# Patient Record
Sex: FEMALE | Race: WHITE | NOT HISPANIC OR LATINO | Employment: OTHER | URBAN - METROPOLITAN AREA
[De-identification: names, ages, dates, MRNs, and addresses within clinical notes are randomized per-mention and may not be internally consistent; named-entity substitution may affect disease eponyms.]

---

## 2017-01-04 RX ORDER — RALOXIFENE HYDROCHLORIDE 60 MG/1
TABLET, FILM COATED ORAL
Qty: 90 TABLET | Refills: 3 | Status: SHIPPED | OUTPATIENT
Start: 2017-01-04 | End: 2017-06-26

## 2017-01-06 ENCOUNTER — OFFICE VISIT (OUTPATIENT)
Dept: INTERNAL MEDICINE | Facility: CLINIC | Age: 77
End: 2017-01-06

## 2017-01-06 VITALS
WEIGHT: 134 LBS | TEMPERATURE: 97.9 F | HEIGHT: 64 IN | SYSTOLIC BLOOD PRESSURE: 114 MMHG | BODY MASS INDEX: 22.88 KG/M2 | DIASTOLIC BLOOD PRESSURE: 72 MMHG

## 2017-01-06 DIAGNOSIS — R10.9 STOMACH PAIN: Primary | ICD-10-CM

## 2017-01-06 LAB
ALBUMIN SERPL-MCNC: 4.2 G/DL (ref 3.2–4.8)
ALBUMIN/GLOB SERPL: 1.6 G/DL (ref 1.5–2.5)
ALP SERPL-CCNC: 57 U/L (ref 25–100)
ALT SERPL W P-5'-P-CCNC: 13 U/L (ref 7–40)
AMYLASE SERPL-CCNC: 76 U/L (ref 30–118)
ANION GAP SERPL CALCULATED.3IONS-SCNC: 9 MMOL/L (ref 3–11)
AST SERPL-CCNC: 23 U/L (ref 0–33)
BASOPHILS # BLD AUTO: 0.01 10*3/MM3 (ref 0–0.2)
BASOPHILS NFR BLD AUTO: 0.2 % (ref 0–1)
BILIRUB SERPL-MCNC: 0.4 MG/DL (ref 0.3–1.2)
BUN BLD-MCNC: 25 MG/DL (ref 9–23)
BUN/CREAT SERPL: 27.8 (ref 7–25)
CALCIUM SPEC-SCNC: 9.7 MG/DL (ref 8.7–10.4)
CHLORIDE SERPL-SCNC: 108 MMOL/L (ref 99–109)
CO2 SERPL-SCNC: 27 MMOL/L (ref 20–31)
CREAT BLD-MCNC: 0.9 MG/DL (ref 0.6–1.3)
DEPRECATED RDW RBC AUTO: 46.6 FL (ref 37–54)
EOSINOPHIL # BLD AUTO: 0.08 10*3/MM3 (ref 0.1–0.3)
EOSINOPHIL NFR BLD AUTO: 1.9 % (ref 0–3)
ERYTHROCYTE [DISTWIDTH] IN BLOOD BY AUTOMATED COUNT: 13.1 % (ref 11.3–14.5)
GFR SERPL CREATININE-BSD FRML MDRD: 61 ML/MIN/1.73
GLOBULIN UR ELPH-MCNC: 2.7 GM/DL
GLUCOSE BLD-MCNC: 84 MG/DL (ref 70–100)
HCT VFR BLD AUTO: 45.1 % (ref 34.5–44)
HGB BLD-MCNC: 14.7 G/DL (ref 11.5–15.5)
IMM GRANULOCYTES # BLD: 0 10*3/MM3 (ref 0–0.03)
IMM GRANULOCYTES NFR BLD: 0 % (ref 0–0.6)
LIPASE SERPL-CCNC: 42 U/L (ref 6–51)
LYMPHOCYTES # BLD AUTO: 1.4 10*3/MM3 (ref 0.6–4.8)
LYMPHOCYTES NFR BLD AUTO: 33.3 % (ref 24–44)
MCH RBC QN AUTO: 31.7 PG (ref 27–31)
MCHC RBC AUTO-ENTMCNC: 32.6 G/DL (ref 32–36)
MCV RBC AUTO: 97.2 FL (ref 80–99)
MONOCYTES # BLD AUTO: 0.36 10*3/MM3 (ref 0–1)
MONOCYTES NFR BLD AUTO: 8.6 % (ref 0–12)
NEUTROPHILS # BLD AUTO: 2.36 10*3/MM3 (ref 1.5–8.3)
NEUTROPHILS NFR BLD AUTO: 56 % (ref 41–71)
PLATELET # BLD AUTO: 184 10*3/MM3 (ref 150–450)
PMV BLD AUTO: 10.6 FL (ref 6–12)
POTASSIUM BLD-SCNC: 4.4 MMOL/L (ref 3.5–5.5)
PROT SERPL-MCNC: 6.9 G/DL (ref 5.7–8.2)
RBC # BLD AUTO: 4.64 10*6/MM3 (ref 3.89–5.14)
SODIUM BLD-SCNC: 144 MMOL/L (ref 132–146)
WBC NRBC COR # BLD: 4.21 10*3/MM3 (ref 3.5–10.8)

## 2017-01-06 PROCEDURE — 83690 ASSAY OF LIPASE: CPT | Performed by: INTERNAL MEDICINE

## 2017-01-06 PROCEDURE — 85025 COMPLETE CBC W/AUTO DIFF WBC: CPT | Performed by: INTERNAL MEDICINE

## 2017-01-06 PROCEDURE — 82150 ASSAY OF AMYLASE: CPT | Performed by: INTERNAL MEDICINE

## 2017-01-06 PROCEDURE — 80053 COMPREHEN METABOLIC PANEL: CPT | Performed by: INTERNAL MEDICINE

## 2017-01-06 PROCEDURE — 99214 OFFICE O/P EST MOD 30 MIN: CPT | Performed by: INTERNAL MEDICINE

## 2017-01-06 PROCEDURE — 83013 H PYLORI (C-13) BREATH: CPT | Performed by: INTERNAL MEDICINE

## 2017-01-06 NOTE — PROGRESS NOTES
"Subjective   Nicole Ruiz is a 76 y.o. female.     History of Present Illness     She has been having abdominal discomfort over last 2 weeks. Not really a pain, more a gnawing discomfort in epigastrum and does not radiate.Feels better when she eats, but then about an hour later will feel it again. She doesn't have any GERD, no n/v, no change in BMs. No belching or chest pressure. No h/o pUD. She has never had EGD. Doesn't generally eat fatty or greasy foods  She is not on any NSAIDs, will occasionally just take a tylenol every 2 weeks. She will have 1 glass of wine ever 2 weeks. Has tried tums - did help, and a     She was in [place] at the end of Nov- didn't get sick there - but these symptoms did start about 3 weeks later so wondering if it may be related. Her  was with her and he has felt ok    The following portions of the patient's history were reviewed and updated as appropriate: allergies, current medications, past family history, past medical history, past social history, past surgical history and problem list.    Review of Systems   Constitutional: Negative for activity change, appetite change, fatigue, fever and unexpected weight change.   Respiratory: Negative for chest tightness.    Cardiovascular: Negative for chest pain.   Gastrointestinal: Positive for abdominal pain. Negative for abdominal distention, anal bleeding, blood in stool, constipation, diarrhea, nausea and vomiting.   Genitourinary: Negative for dysuria.       Objective   Blood pressure 114/72, temperature 97.9 °F (36.6 °C), temperature source Temporal Artery , height 64.1\" (162.8 cm), weight 134 lb (60.8 kg).  Physical Exam   Constitutional: She is oriented to person, place, and time. She appears well-nourished.   HENT:   Head: Normocephalic and atraumatic.   Eyes: Conjunctivae and EOM are normal. No scleral icterus.   Cardiovascular: Normal rate and regular rhythm.    Pulmonary/Chest: Effort normal and breath sounds normal. "   Abdominal: Soft. Bowel sounds are normal. She exhibits no distension and no mass. There is tenderness (mild epigastric. no RUQ or LUQ tenderness). There is no rebound and no guarding.   Neurological: She is alert and oriented to person, place, and time.   Skin: Skin is warm and dry.   Psychiatric: She has a normal mood and affect. Her behavior is normal. Judgment and thought content normal.       Assessment/Plan   Nicole was seen today for gnawing feeling.    Diagnoses and all orders for this visit:    Stomach pain. ? Gastritis or PUD> will check labs and hpylori. Will treat w/ dexilant. If no better, we will consider imaging and/or EGD  -     CBC & Differential  -     Comprehensive Metabolic Panel  -     Lipase  -     Amylase  -     CBC Auto Differential  -     H. Pylori Breath Test    dexilant samples x 2 weeks given, 1 qam

## 2017-01-06 NOTE — MR AVS SNAPSHOT
Nicole Ruiz   2017 2:15 PM   Office Visit    Dept Phone:  [phone]   Encounter #:  [id number]    Provider:  Cherelle Tee MD   Department:  [facility] INTERNAL MEDICINE                Your Full Care Plan              Your Updated Medication List          This list is accurate as of: 17  3:20 PM.  Always use your most recent med list.                diazePAM 5 MG tablet   Commonly known as:  VALIUM       levothyroxine 50 MCG tablet   Commonly known as:  SYNTHROID, LEVOTHROID   Take 1 tablet by mouth daily.       lisinopril 10 MG tablet   Commonly known as:  PRINIVIL,ZESTRIL   Take 1 tablet by mouth daily.       raloxifene 60 MG tablet   Commonly known as:  EVISTA   TAKE 1 TABLET DAILY               We Performed the Following     Amylase     CBC & Differential     CBC Auto Differential     Comprehensive Metabolic Panel     H. Pylori Breath Test     Lipase       You Were Diagnosed With        Codes Comments    Stomach pain    -  Primary ICD-10-CM: R10.9  ICD-9-CM: 536.8       Instructions     None    Patient Instructions History      Upcoming Appointments     Visit Type Date Time Department    OFFICE VISIT 2017  2:15 PM [address]    FOLLOW UP 3/27/2017 11:00 AM [address] L [address]      [facility] Signup     [facility] [employer] allows you to send messages to your doctor, view your test results, renew your prescriptions, schedule appointments, and more. To sign up, go to [employer] and click on the Sign Up Now link in the New User? box. Enter your [employer] Activation Code exactly as it appears below along with the last four digits of your Social Security Number and your Date of Birth () to complete the sign-up process. If you do not sign up before the expiration date, you must request a new code.    [employer] Activation Code: [email or web address]  Expires: 2017  3:20 PM    If you have questions, you can email  [email or web address] or call [phone] to talk to our MyChart staff. Remember, [employer] is NOT to be used for urgent needs. For medical emergencies, dial 911.               Other Info from Your Visit           Your Appointments     Mar 27, 2017 11:00 AM EDT   Follow Up with Cherelle Tee MD   [facility] INTERNAL MEDICINE (--)    [address]  [place] [id number] [phone]           Arrive 15 minutes prior to appointment.              Allergies     No Known Allergies      Reason for Visit     gnawing feeling in middle of abdomen, feels better when she eats, no other symptoms, sx x 2 weeks      Vital Signs     Blood Pressure Temperature Height Weight Body Mass Index Smoking Status    114/72 (BP Location: Left arm) 97.9 °F (36.6 °C) (Temporal Artery ) 64.1\" (162.8 cm) 134 lb (60.8 kg) 22.93 kg/m2 Never Smoker      Problems and Diagnoses Noted     Stomach ache    -  Primary      Results         "

## 2017-01-11 LAB — UREA BREATH TEST QL: NEGATIVE

## 2017-03-23 RX ORDER — LISINOPRIL 10 MG/1
TABLET ORAL
Qty: 90 TABLET | Refills: 0 | Status: SHIPPED | OUTPATIENT
Start: 2017-03-23 | End: 2017-06-21 | Stop reason: SDUPTHER

## 2017-03-23 RX ORDER — LEVOTHYROXINE SODIUM 0.05 MG/1
TABLET ORAL
Qty: 90 TABLET | Refills: 0 | Status: SHIPPED | OUTPATIENT
Start: 2017-03-23 | End: 2017-06-21 | Stop reason: SDUPTHER

## 2017-03-27 ENCOUNTER — OFFICE VISIT (OUTPATIENT)
Dept: INTERNAL MEDICINE | Facility: CLINIC | Age: 77
End: 2017-03-27

## 2017-03-27 VITALS
DIASTOLIC BLOOD PRESSURE: 72 MMHG | WEIGHT: 131.2 LBS | SYSTOLIC BLOOD PRESSURE: 112 MMHG | HEIGHT: 64 IN | TEMPERATURE: 98.2 F | BODY MASS INDEX: 22.4 KG/M2

## 2017-03-27 DIAGNOSIS — I10 BENIGN ESSENTIAL HYPERTENSION: Primary | ICD-10-CM

## 2017-03-27 DIAGNOSIS — E03.8 OTHER SPECIFIED HYPOTHYROIDISM: ICD-10-CM

## 2017-03-27 DIAGNOSIS — M81.0 OSTEOPOROSIS: ICD-10-CM

## 2017-03-27 DIAGNOSIS — F41.9 ANXIETY: ICD-10-CM

## 2017-03-27 PROCEDURE — 99213 OFFICE O/P EST LOW 20 MIN: CPT | Performed by: INTERNAL MEDICINE

## 2017-03-27 RX ORDER — LEVOTHYROXINE SODIUM 0.05 MG/1
TABLET ORAL
Qty: 90 TABLET | Refills: 0 | Status: CANCELLED | OUTPATIENT
Start: 2017-03-27

## 2017-03-27 RX ORDER — LISINOPRIL 10 MG/1
TABLET ORAL
Qty: 90 TABLET | Refills: 0 | Status: CANCELLED | OUTPATIENT
Start: 2017-03-27

## 2017-03-27 RX ORDER — DIAZEPAM 5 MG/1
5 TABLET ORAL NIGHTLY PRN
Qty: 30 TABLET | Refills: 2 | Status: SHIPPED | OUTPATIENT
Start: 2017-03-27 | End: 2018-07-11 | Stop reason: SDUPTHER

## 2017-03-27 NOTE — PROGRESS NOTES
"Subjective   Nicole Ruiz is a 76 y.o. female.     History of Present Illness     Here for f/u on:    Epigastric pain in 1/17- has resolved. dexilant helped. Not on anymore and no recurrence    Insomnia/anxiety - uses valium as needed at night. Also takes for flights. Some weeks takes 2-3x, but then may not use for several weeks at all    htn- taking lisinopril regularly, BPS at home similar to today's reading    Hypothyroid - feels well, weight stable    The following portions of the patient's history were reviewed and updated as appropriate: allergies, current medications, past family history, past medical history, past social history, past surgical history and problem list.    Review of Systems   Constitutional: Negative for activity change, appetite change, fatigue, fever and unexpected weight change.   Gastrointestinal: Negative for abdominal pain, nausea and vomiting.       Objective   Blood pressure 112/72, temperature 98.2 °F (36.8 °C), temperature source Temporal Artery , height 64.1\" (162.8 cm), weight 131 lb 3.2 oz (59.5 kg).  Physical Exam   Constitutional: She is oriented to person, place, and time. She appears well-developed and well-nourished. No distress.   HENT:   Head: Normocephalic and atraumatic.   Eyes: Conjunctivae and EOM are normal.   Cardiovascular: Normal rate, regular rhythm and normal heart sounds.  Exam reveals no gallop and no friction rub.    No murmur heard.  Pulmonary/Chest: Effort normal and breath sounds normal. No respiratory distress. She has no wheezes.   Abdominal: Soft. Bowel sounds are normal.   Neurological: She is alert and oriented to person, place, and time.   Skin: Skin is warm and dry. She is not diaphoretic.   Psychiatric: She has a normal mood and affect. Her behavior is normal. Judgment and thought content normal.       Assessment/Plan   Nicole was seen today for med refill.    Diagnoses and all orders for this visit:    Benign essential hypertension - good " control    Anxiety - Pt has already signed controlled substance contract. Michael done today and appropriate.   -     diazePAM (VALIUM) 5 MG tablet; Take 1 tablet by mouth At Night As Needed for Anxiety.      Osteoporosis - on evista.dexa will be due in July. Will schedule at physical    Other specified hypothyroidism - sehe will return for physical and will do labs then

## 2017-04-25 ENCOUNTER — LAB (OUTPATIENT)
Dept: INTERNAL MEDICINE | Facility: CLINIC | Age: 77
End: 2017-04-25

## 2017-04-25 DIAGNOSIS — I10 ESSENTIAL HYPERTENSION: Primary | ICD-10-CM

## 2017-04-25 DIAGNOSIS — E78.00 ELEVATED CHOLESTEROL: ICD-10-CM

## 2017-04-25 DIAGNOSIS — Z79.899 ENCOUNTER FOR LONG-TERM (CURRENT) USE OF HIGH-RISK MEDICATION: ICD-10-CM

## 2017-04-25 DIAGNOSIS — E05.90 HYPERTHYROIDISM: ICD-10-CM

## 2017-04-25 LAB
ALBUMIN SERPL-MCNC: 4 G/DL (ref 3.2–4.8)
ALBUMIN/GLOB SERPL: 1.3 G/DL (ref 1.5–2.5)
ALP SERPL-CCNC: 56 U/L (ref 25–100)
ALT SERPL W P-5'-P-CCNC: 12 U/L (ref 7–40)
ANION GAP SERPL CALCULATED.3IONS-SCNC: 5 MMOL/L (ref 3–11)
ARTICHOKE IGE QN: 92 MG/DL (ref 0–130)
AST SERPL-CCNC: 24 U/L (ref 0–33)
BASOPHILS # BLD AUTO: 0.02 10*3/MM3 (ref 0–0.2)
BASOPHILS NFR BLD AUTO: 0.5 % (ref 0–1)
BILIRUB SERPL-MCNC: 0.6 MG/DL (ref 0.3–1.2)
BUN BLD-MCNC: 19 MG/DL (ref 9–23)
BUN/CREAT SERPL: 21.1 (ref 7–25)
CALCIUM SPEC-SCNC: 9.6 MG/DL (ref 8.7–10.4)
CHLORIDE SERPL-SCNC: 106 MMOL/L (ref 99–109)
CHOLEST SERPL-MCNC: 230 MG/DL (ref 0–200)
CO2 SERPL-SCNC: 29 MMOL/L (ref 20–31)
CREAT BLD-MCNC: 0.9 MG/DL (ref 0.6–1.3)
DEPRECATED RDW RBC AUTO: 46.5 FL (ref 37–54)
EOSINOPHIL # BLD AUTO: 0.11 10*3/MM3 (ref 0.1–0.3)
EOSINOPHIL NFR BLD AUTO: 2.8 % (ref 0–3)
ERYTHROCYTE [DISTWIDTH] IN BLOOD BY AUTOMATED COUNT: 13 % (ref 11.3–14.5)
GFR SERPL CREATININE-BSD FRML MDRD: 61 ML/MIN/1.73
GLOBULIN UR ELPH-MCNC: 3 GM/DL
GLUCOSE BLD-MCNC: 92 MG/DL (ref 70–100)
HCT VFR BLD AUTO: 44.7 % (ref 34.5–44)
HDLC SERPL-MCNC: 107 MG/DL (ref 40–60)
HGB BLD-MCNC: 14.8 G/DL (ref 11.5–15.5)
IMM GRANULOCYTES # BLD: 0.01 10*3/MM3 (ref 0–0.03)
IMM GRANULOCYTES NFR BLD: 0.3 % (ref 0–0.6)
LYMPHOCYTES # BLD AUTO: 1.21 10*3/MM3 (ref 0.6–4.8)
LYMPHOCYTES NFR BLD AUTO: 31.3 % (ref 24–44)
MCH RBC QN AUTO: 32.3 PG (ref 27–31)
MCHC RBC AUTO-ENTMCNC: 33.1 G/DL (ref 32–36)
MCV RBC AUTO: 97.6 FL (ref 80–99)
MONOCYTES # BLD AUTO: 0.39 10*3/MM3 (ref 0–1)
MONOCYTES NFR BLD AUTO: 10.1 % (ref 0–12)
NEUTROPHILS # BLD AUTO: 2.13 10*3/MM3 (ref 1.5–8.3)
NEUTROPHILS NFR BLD AUTO: 55 % (ref 41–71)
PLATELET # BLD AUTO: 189 10*3/MM3 (ref 150–450)
PMV BLD AUTO: 10.2 FL (ref 6–12)
POTASSIUM BLD-SCNC: 4.6 MMOL/L (ref 3.5–5.5)
PROT SERPL-MCNC: 7 G/DL (ref 5.7–8.2)
RBC # BLD AUTO: 4.58 10*6/MM3 (ref 3.89–5.14)
SODIUM BLD-SCNC: 140 MMOL/L (ref 132–146)
T4 FREE SERPL-MCNC: 1.42 NG/DL (ref 0.89–1.76)
TRIGL SERPL-MCNC: 55 MG/DL (ref 0–150)
TSH SERPL DL<=0.05 MIU/L-ACNC: 3.15 MIU/ML (ref 0.35–5.35)
WBC NRBC COR # BLD: 3.87 10*3/MM3 (ref 3.5–10.8)

## 2017-04-25 PROCEDURE — 85025 COMPLETE CBC W/AUTO DIFF WBC: CPT | Performed by: INTERNAL MEDICINE

## 2017-04-25 PROCEDURE — 80053 COMPREHEN METABOLIC PANEL: CPT | Performed by: INTERNAL MEDICINE

## 2017-04-25 PROCEDURE — 84443 ASSAY THYROID STIM HORMONE: CPT | Performed by: INTERNAL MEDICINE

## 2017-04-25 PROCEDURE — 84439 ASSAY OF FREE THYROXINE: CPT | Performed by: INTERNAL MEDICINE

## 2017-04-25 PROCEDURE — 80061 LIPID PANEL: CPT | Performed by: INTERNAL MEDICINE

## 2017-04-28 ENCOUNTER — OFFICE VISIT (OUTPATIENT)
Dept: INTERNAL MEDICINE | Facility: CLINIC | Age: 77
End: 2017-04-28

## 2017-04-28 VITALS
HEART RATE: 72 BPM | BODY MASS INDEX: 22.13 KG/M2 | SYSTOLIC BLOOD PRESSURE: 120 MMHG | HEIGHT: 64 IN | RESPIRATION RATE: 12 BRPM | DIASTOLIC BLOOD PRESSURE: 72 MMHG | TEMPERATURE: 98.6 F | WEIGHT: 129.6 LBS

## 2017-04-28 DIAGNOSIS — Z78.0 POSTMENOPAUSAL: ICD-10-CM

## 2017-04-28 DIAGNOSIS — Z00.00 WELLNESS EXAMINATION: ICD-10-CM

## 2017-04-28 DIAGNOSIS — Z00.00 ENCOUNTER FOR ANNUAL PHYSICAL EXAM: Primary | ICD-10-CM

## 2017-04-28 LAB
BILIRUB BLD-MCNC: NEGATIVE MG/DL
CLARITY, POC: CLEAR
COLOR UR: NORMAL
GLUCOSE UR STRIP-MCNC: NEGATIVE MG/DL
KETONES UR QL: NEGATIVE
LEUKOCYTE EST, POC: NEGATIVE
NITRITE UR-MCNC: NEGATIVE MG/ML
PH UR: 5.5 [PH] (ref 5–8)
PROT UR STRIP-MCNC: NEGATIVE MG/DL
RBC # UR STRIP: NEGATIVE /UL
SP GR UR: 1.03 (ref 1–1.03)
UROBILINOGEN UR QL: NORMAL

## 2017-04-28 PROCEDURE — 99397 PER PM REEVAL EST PAT 65+ YR: CPT | Performed by: INTERNAL MEDICINE

## 2017-04-28 PROCEDURE — G0439 PPPS, SUBSEQ VISIT: HCPCS | Performed by: INTERNAL MEDICINE

## 2017-04-28 PROCEDURE — 81003 URINALYSIS AUTO W/O SCOPE: CPT | Performed by: INTERNAL MEDICINE

## 2017-04-28 RX ORDER — LISINOPRIL 10 MG/1
10 TABLET ORAL DAILY
Qty: 90 TABLET | Refills: 1 | Status: CANCELLED | OUTPATIENT
Start: 2017-04-28

## 2017-04-28 RX ORDER — LEVOTHYROXINE SODIUM 0.05 MG/1
50 TABLET ORAL DAILY
Qty: 90 TABLET | Refills: 1 | Status: CANCELLED | OUTPATIENT
Start: 2017-04-28

## 2017-05-11 ENCOUNTER — HOSPITAL ENCOUNTER (OUTPATIENT)
Dept: BONE DENSITY | Facility: HOSPITAL | Age: 77
Discharge: HOME | End: 2017-05-11
Admitting: INTERNAL MEDICINE

## 2017-05-11 PROCEDURE — 77080 DXA BONE DENSITY AXIAL: CPT | Performed by: RADIOLOGY

## 2017-05-11 PROCEDURE — 77080 DXA BONE DENSITY AXIAL: CPT

## 2017-06-21 RX ORDER — LEVOTHYROXINE SODIUM 0.05 MG/1
TABLET ORAL
Qty: 90 TABLET | Refills: 1 | Status: SHIPPED | OUTPATIENT
Start: 2017-06-21 | End: 2017-12-18 | Stop reason: SDUPTHER

## 2017-06-21 RX ORDER — LISINOPRIL 10 MG/1
TABLET ORAL
Qty: 90 TABLET | Refills: 1 | Status: SHIPPED | OUTPATIENT
Start: 2017-06-21 | End: 2018-04-24 | Stop reason: SDUPTHER

## 2017-06-26 RX ORDER — ALENDRONATE SODIUM 70 MG/1
70 TABLET ORAL
Qty: 12 TABLET | Refills: 3 | Status: SHIPPED | OUTPATIENT
Start: 2017-06-26 | End: 2018-07-15 | Stop reason: SDUPTHER

## 2017-10-09 ENCOUNTER — OFFICE VISIT (OUTPATIENT)
Dept: INTERNAL MEDICINE | Facility: CLINIC | Age: 77
End: 2017-10-09

## 2017-10-09 VITALS
BODY MASS INDEX: 22.26 KG/M2 | TEMPERATURE: 98 F | WEIGHT: 130.4 LBS | HEIGHT: 64 IN | DIASTOLIC BLOOD PRESSURE: 78 MMHG | SYSTOLIC BLOOD PRESSURE: 116 MMHG

## 2017-10-09 DIAGNOSIS — I10 BENIGN ESSENTIAL HYPERTENSION: Primary | ICD-10-CM

## 2017-10-09 DIAGNOSIS — R10.13 DYSPEPSIA: ICD-10-CM

## 2017-10-09 DIAGNOSIS — E03.8 OTHER SPECIFIED HYPOTHYROIDISM: ICD-10-CM

## 2017-10-09 DIAGNOSIS — Z23 NEED FOR INFLUENZA VACCINATION: ICD-10-CM

## 2017-10-09 LAB
ALBUMIN SERPL-MCNC: 4.1 G/DL (ref 3.2–4.8)
ALBUMIN/GLOB SERPL: 1.6 G/DL (ref 1.5–2.5)
ALP SERPL-CCNC: 64 U/L (ref 25–100)
ALT SERPL W P-5'-P-CCNC: 18 U/L (ref 7–40)
ANION GAP SERPL CALCULATED.3IONS-SCNC: 6 MMOL/L (ref 3–11)
AST SERPL-CCNC: 24 U/L (ref 0–33)
BILIRUB SERPL-MCNC: 0.6 MG/DL (ref 0.3–1.2)
BUN BLD-MCNC: 22 MG/DL (ref 9–23)
BUN/CREAT SERPL: 24.4 (ref 7–25)
CALCIUM SPEC-SCNC: 9.8 MG/DL (ref 8.7–10.4)
CHLORIDE SERPL-SCNC: 104 MMOL/L (ref 99–109)
CO2 SERPL-SCNC: 31 MMOL/L (ref 20–31)
CREAT BLD-MCNC: 0.9 MG/DL (ref 0.6–1.3)
GFR SERPL CREATININE-BSD FRML MDRD: 61 ML/MIN/1.73
GLOBULIN UR ELPH-MCNC: 2.6 GM/DL
GLUCOSE BLD-MCNC: 98 MG/DL (ref 70–100)
POTASSIUM BLD-SCNC: 4.7 MMOL/L (ref 3.5–5.5)
PROT SERPL-MCNC: 6.7 G/DL (ref 5.7–8.2)
SODIUM BLD-SCNC: 141 MMOL/L (ref 132–146)
TSH SERPL DL<=0.05 MIU/L-ACNC: 3.51 MIU/ML (ref 0.35–5.35)

## 2017-10-09 PROCEDURE — 84443 ASSAY THYROID STIM HORMONE: CPT | Performed by: INTERNAL MEDICINE

## 2017-10-09 PROCEDURE — 99214 OFFICE O/P EST MOD 30 MIN: CPT | Performed by: INTERNAL MEDICINE

## 2017-10-09 PROCEDURE — 90662 IIV NO PRSV INCREASED AG IM: CPT | Performed by: INTERNAL MEDICINE

## 2017-10-09 PROCEDURE — 80053 COMPREHEN METABOLIC PANEL: CPT | Performed by: INTERNAL MEDICINE

## 2017-10-09 PROCEDURE — G0008 ADMIN INFLUENZA VIRUS VAC: HCPCS | Performed by: INTERNAL MEDICINE

## 2017-10-09 RX ORDER — LEVOTHYROXINE SODIUM 0.05 MG/1
TABLET ORAL
Qty: 90 TABLET | Refills: 1 | Status: CANCELLED | OUTPATIENT
Start: 2017-10-09

## 2017-10-09 RX ORDER — LISINOPRIL 10 MG/1
TABLET ORAL
Qty: 90 TABLET | Refills: 1 | Status: CANCELLED | OUTPATIENT
Start: 2017-10-09

## 2017-10-09 NOTE — PROGRESS NOTES
"Subjective   Nicole Ruiz is a 77 y.o. female.     History of Present Illness     Here for f/u on HTN and hypothyroid    htn - checks and usually 110s/70s    Hypothyroid -feels well overall.    Stomach pain - had another episode x4-5 days but has better this past week. Didn't have to take anything, just resolved. Felt similar to waht she had in 1/17 whenb we used dexilant briefly.she is on fosamax now, but doesn't feel it is worse after she takes, and had yesterday and feels ok today    The following portions of the patient's history were reviewed and updated as appropriate: allergies, current medications, past family history, past medical history, past social history, past surgical history and problem list.    Review of Systems   Constitutional: Negative for fatigue and fever.   Respiratory: Negative for shortness of breath.    Cardiovascular: Negative for chest pain.   Gastrointestinal: Positive for abdominal pain (resolved). Negative for constipation, diarrhea, nausea and vomiting.       Objective   Blood pressure 116/78, temperature 98 °F (36.7 °C), temperature source Temporal Artery , height 64\" (162.6 cm), weight 130 lb 6.4 oz (59.1 kg).  Physical Exam   Constitutional: She is oriented to person, place, and time. She appears well-developed and well-nourished. No distress.   HENT:   Head: Normocephalic and atraumatic.   Eyes: Conjunctivae and EOM are normal.   Cardiovascular: Normal rate, regular rhythm and normal heart sounds.  Exam reveals no gallop and no friction rub.    No murmur heard.  Pulmonary/Chest: Effort normal and breath sounds normal. No respiratory distress. She has no wheezes.   Abdominal: Soft. Bowel sounds are normal. She exhibits no distension. There is no tenderness.   Neurological: She is alert and oriented to person, place, and time.   Skin: Skin is warm and dry. She is not diaphoretic.   Psychiatric: She has a normal mood and affect. Her behavior is normal. Judgment and thought content " normal.       Assessment/Plan   Nicole was seen today for hypertension, hypothyroidism, med refill and stomach issues.    Diagnoses and all orders for this visit:    Benign essential hypertension  -     Comprehensive Metabolic Panel    Need for influenza vaccination  -     Flu Vaccine High Dose PF 65YR+    Other specified hypothyroidism  -     TSH    Dyspepsia - resolved. If recurs, we may need to hold the fosamax. She will let me know

## 2017-11-30 ENCOUNTER — TELEPHONE (OUTPATIENT)
Dept: INTERNAL MEDICINE | Facility: CLINIC | Age: 77
End: 2017-11-30

## 2017-11-30 RX ORDER — OMEPRAZOLE 20 MG/1
20 CAPSULE, DELAYED RELEASE ORAL DAILY
Qty: 30 CAPSULE | Refills: 1 | Status: SHIPPED | OUTPATIENT
Start: 2017-11-30 | End: 2020-01-23 | Stop reason: SDUPTHER

## 2017-12-18 RX ORDER — LEVOTHYROXINE SODIUM 0.05 MG/1
TABLET ORAL
Qty: 90 TABLET | Refills: 1 | Status: SHIPPED | OUTPATIENT
Start: 2017-12-18 | End: 2018-06-16 | Stop reason: SDUPTHER

## 2018-04-24 RX ORDER — LISINOPRIL 10 MG/1
TABLET ORAL
Qty: 90 TABLET | Refills: 0 | Status: SHIPPED | OUTPATIENT
Start: 2018-04-24 | End: 2018-07-15 | Stop reason: SDUPTHER

## 2018-06-18 DIAGNOSIS — E03.9 ACQUIRED HYPOTHYROIDISM: ICD-10-CM

## 2018-06-18 DIAGNOSIS — E55.9 VITAMIN D DEFICIENCY: ICD-10-CM

## 2018-06-18 DIAGNOSIS — I10 ESSENTIAL HYPERTENSION: Primary | ICD-10-CM

## 2018-07-05 NOTE — PROGRESS NOTES
History of Present Illness     Here for medicare wellness exam    living will: pt has  fall risk: none, balance good  memory: no concerns or troubles  Depression - phq-2- 0  specialists:  GI - Dr Mccartney  opth - Dr Perez  diet -healthy - plenty of fruits/veggies, but does like sugar.  MVI, caltrate, joint select (glucosamine/hyaluronic); probiotic  exericse - walks regualrly, outside or on treadmill, and plays golf        The following portions of the patient's history were reviewed and updated as appropriate: allergies, current medications, past family history, past medical history, past social history, past surgical history and problem list.    Review of Systems   Constitutional: Negative for fatigue and fever.   HENT: Negative for congestion and sore throat.  R eye has cataract  Eyes: Negative for visual disturbance.   Respiratory: Negative for cough and shortness of breath.    Cardiovascular: Negative for chest pain, palpitations and leg swelling.   Gastrointestinal: Negative for abdominal distention, abdominal pain, blood in stool, constipation, diarrhea and nausea. uses prilosec prn, but has not needed often  Genitourinary: Negative for difficulty urinating, dysuria and frequency.   Musculoskeletal: Negative for arthralgias.   Skin: Negative for rash.   Allergic/Immunologic: Negative for immunocompromised state.   Neurological: Negative for dizziness and headaches.   Psychiatric/Behavioral: Negative for dysphoric mood and sleep disturbance. occasionally uses valium for sleep but not often. Needs refilll      Objective    Vitals:    07/11/18 1351   BP: 114/80   Pulse: 69   Temp: 98.7 °F (37.1 °C)   SpO2: 96%     Physical Exam   Constitutional: She is oriented to person, place, and time. She appears well-developed and well-nourished. No distress.   HENT:   Head: Normocephalic and atraumatic.   Right Ear: External ear normal.   Left Ear: External ear normal.   Mouth/Throat: Oropharynx is clear and moist. No  oropharyngeal exudate.   Eyes: Conjunctivae and EOM are normal. Pupils are equal, round, and reactive to light.   Neck: Normal range of motion. Neck supple. No thyromegaly present.   Cardiovascular: Normal rate, regular rhythm, normal heart sounds and intact distal pulses.  Exam reveals no gallop and no friction rub.    No murmur heard.  Pulmonary/Chest: Effort normal and breath sounds normal. No respiratory distress. She has no wheezes. She has no rales.   Abdominal: Soft. Bowel sounds are normal. She exhibits no mass. There is no tenderness. There is no rebound and no guarding.   Musculoskeletal: Normal range of motion. She exhibits no edema or deformity.   Lymphadenopathy:     She has no cervical adenopathy.   Neurological: She is alert and oriented to person, place, and time. No cranial nerve deficit. She exhibits normal muscle tone. Coordination normal.   Skin: Skin is warm and dry. She is not diaphoretic.   Psychiatric: She has a normal mood and affect. Her behavior is normal. Judgment and thought content normal.       Assessment/Plan   Nicole was seen today for annual exam.    Diagnoses and all orders for this visit:    Encounter for annual physical exam/wellness exam -  Regular exercise/healthy diet. BSE q month. Sunscreen use encouraged. caclium intake reviewed. She will return for fasting labs  Living will -pt has  Addie due 9/18  Colon done in '15  Pneumovax - had  prevnar- had  DT due '23  DEXA due 5/19  Shingles - shingrix discussed -  can check at pharmacy since we do not have   Does not need paps since age 65 or older and has had normal paps in past  -     POC Urinalysis Dipstick, Automated    Primary insomnia - -Pt has already signed controlled substance contract. Michael done today and appropriate.   -     diazePAM (VALIUM) 5 MG tablet; Take 1 tablet by mouth At Night As Needed for Anxiety.    Benign essential hypertension - good control    Acquired hypothyroidism - will check

## 2018-07-11 ENCOUNTER — OFFICE VISIT (OUTPATIENT)
Dept: INTERNAL MEDICINE | Facility: CLINIC | Age: 78
End: 2018-07-11

## 2018-07-11 VITALS
SYSTOLIC BLOOD PRESSURE: 114 MMHG | DIASTOLIC BLOOD PRESSURE: 80 MMHG | HEIGHT: 64 IN | HEART RATE: 69 BPM | BODY MASS INDEX: 22.33 KG/M2 | TEMPERATURE: 98.7 F | OXYGEN SATURATION: 96 % | WEIGHT: 130.8 LBS

## 2018-07-11 DIAGNOSIS — Z00.00 MEDICARE ANNUAL WELLNESS VISIT, SUBSEQUENT: ICD-10-CM

## 2018-07-11 DIAGNOSIS — I10 BENIGN ESSENTIAL HYPERTENSION: ICD-10-CM

## 2018-07-11 DIAGNOSIS — F51.01 PRIMARY INSOMNIA: ICD-10-CM

## 2018-07-11 DIAGNOSIS — Z00.00 ENCOUNTER FOR ANNUAL PHYSICAL EXAM: Primary | ICD-10-CM

## 2018-07-11 DIAGNOSIS — E03.9 ACQUIRED HYPOTHYROIDISM: ICD-10-CM

## 2018-07-11 PROCEDURE — 99397 PER PM REEVAL EST PAT 65+ YR: CPT | Performed by: INTERNAL MEDICINE

## 2018-07-11 PROCEDURE — G0439 PPPS, SUBSEQ VISIT: HCPCS | Performed by: INTERNAL MEDICINE

## 2018-07-11 RX ORDER — DIAZEPAM 5 MG/1
5 TABLET ORAL NIGHTLY PRN
Qty: 30 TABLET | Refills: 2 | Status: SHIPPED | OUTPATIENT
Start: 2018-07-11 | End: 2020-06-01 | Stop reason: SDUPTHER

## 2018-07-13 ENCOUNTER — LAB (OUTPATIENT)
Dept: INTERNAL MEDICINE | Facility: CLINIC | Age: 78
End: 2018-07-13

## 2018-07-13 DIAGNOSIS — I10 ESSENTIAL HYPERTENSION: ICD-10-CM

## 2018-07-13 DIAGNOSIS — E55.9 VITAMIN D DEFICIENCY: ICD-10-CM

## 2018-07-13 DIAGNOSIS — E03.9 ACQUIRED HYPOTHYROIDISM: ICD-10-CM

## 2018-07-13 LAB
25(OH)D3 SERPL-MCNC: 40.6 NG/ML
ALBUMIN SERPL-MCNC: 4.31 G/DL (ref 3.2–4.8)
ALBUMIN/GLOB SERPL: 1.7 G/DL (ref 1.5–2.5)
ALP SERPL-CCNC: 59 U/L (ref 25–100)
ALT SERPL W P-5'-P-CCNC: 13 U/L (ref 7–40)
ANION GAP SERPL CALCULATED.3IONS-SCNC: 5 MMOL/L (ref 3–11)
ARTICHOKE IGE QN: 118 MG/DL (ref 0–130)
AST SERPL-CCNC: 24 U/L (ref 0–33)
BASOPHILS # BLD AUTO: 0.03 10*3/MM3 (ref 0–0.2)
BASOPHILS NFR BLD AUTO: 0.8 % (ref 0–1)
BILIRUB SERPL-MCNC: 0.6 MG/DL (ref 0.3–1.2)
BUN BLD-MCNC: 27 MG/DL (ref 9–23)
BUN/CREAT SERPL: 30 (ref 7–25)
CALCIUM SPEC-SCNC: 9 MG/DL (ref 8.7–10.4)
CHLORIDE SERPL-SCNC: 109 MMOL/L (ref 99–109)
CHOLEST SERPL-MCNC: 248 MG/DL (ref 0–200)
CO2 SERPL-SCNC: 28 MMOL/L (ref 20–31)
CREAT BLD-MCNC: 0.9 MG/DL (ref 0.6–1.3)
DEPRECATED RDW RBC AUTO: 47.4 FL (ref 37–54)
EOSINOPHIL # BLD AUTO: 0.15 10*3/MM3 (ref 0–0.3)
EOSINOPHIL NFR BLD AUTO: 3.8 % (ref 0–3)
ERYTHROCYTE [DISTWIDTH] IN BLOOD BY AUTOMATED COUNT: 13.4 % (ref 11.3–14.5)
GFR SERPL CREATININE-BSD FRML MDRD: 61 ML/MIN/1.73
GLOBULIN UR ELPH-MCNC: 2.5 GM/DL
GLUCOSE BLD-MCNC: 84 MG/DL (ref 70–100)
HCT VFR BLD AUTO: 45.3 % (ref 34.5–44)
HDLC SERPL-MCNC: 108 MG/DL (ref 40–60)
HGB BLD-MCNC: 14.8 G/DL (ref 11.5–15.5)
IMM GRANULOCYTES # BLD: 0.01 10*3/MM3 (ref 0–0.03)
IMM GRANULOCYTES NFR BLD: 0.3 % (ref 0–0.6)
LYMPHOCYTES # BLD AUTO: 1.42 10*3/MM3 (ref 0.6–4.8)
LYMPHOCYTES NFR BLD AUTO: 35.9 % (ref 24–44)
MCH RBC QN AUTO: 31.6 PG (ref 27–31)
MCHC RBC AUTO-ENTMCNC: 32.7 G/DL (ref 32–36)
MCV RBC AUTO: 96.6 FL (ref 80–99)
MONOCYTES # BLD AUTO: 0.42 10*3/MM3 (ref 0–1)
MONOCYTES NFR BLD AUTO: 10.6 % (ref 0–12)
NEUTROPHILS # BLD AUTO: 1.93 10*3/MM3 (ref 1.5–8.3)
NEUTROPHILS NFR BLD AUTO: 48.9 % (ref 41–71)
PLATELET # BLD AUTO: 187 10*3/MM3 (ref 150–450)
PMV BLD AUTO: 10.6 FL (ref 6–12)
POTASSIUM BLD-SCNC: 4.4 MMOL/L (ref 3.5–5.5)
PROT SERPL-MCNC: 6.8 G/DL (ref 5.7–8.2)
RBC # BLD AUTO: 4.69 10*6/MM3 (ref 3.89–5.14)
SODIUM BLD-SCNC: 142 MMOL/L (ref 132–146)
TRIGL SERPL-MCNC: 78 MG/DL (ref 0–150)
TSH SERPL DL<=0.05 MIU/L-ACNC: 4.11 MIU/ML (ref 0.35–5.35)
WBC NRBC COR # BLD: 3.95 10*3/MM3 (ref 3.5–10.8)

## 2018-07-13 PROCEDURE — 82306 VITAMIN D 25 HYDROXY: CPT | Performed by: INTERNAL MEDICINE

## 2018-07-13 PROCEDURE — 80061 LIPID PANEL: CPT | Performed by: INTERNAL MEDICINE

## 2018-07-13 PROCEDURE — 84443 ASSAY THYROID STIM HORMONE: CPT | Performed by: INTERNAL MEDICINE

## 2018-07-13 PROCEDURE — 80053 COMPREHEN METABOLIC PANEL: CPT | Performed by: INTERNAL MEDICINE

## 2018-07-13 PROCEDURE — 85025 COMPLETE CBC W/AUTO DIFF WBC: CPT | Performed by: INTERNAL MEDICINE

## 2018-07-15 RX ORDER — LISINOPRIL 10 MG/1
10 TABLET ORAL DAILY
Qty: 90 TABLET | Refills: 1 | Status: SHIPPED | OUTPATIENT
Start: 2018-07-15 | End: 2019-02-12 | Stop reason: SDUPTHER

## 2018-07-15 RX ORDER — ALENDRONATE SODIUM 70 MG/1
70 TABLET ORAL
Qty: 12 TABLET | Refills: 3 | Status: SHIPPED | OUTPATIENT
Start: 2018-07-15 | End: 2019-03-20 | Stop reason: SINTOL

## 2018-07-15 RX ORDER — LEVOTHYROXINE SODIUM 0.05 MG/1
TABLET ORAL
Qty: 90 TABLET | Refills: 1 | Status: SHIPPED | OUTPATIENT
Start: 2018-07-15 | End: 2019-03-19 | Stop reason: SDUPTHER

## 2018-07-17 ENCOUNTER — TELEPHONE (OUTPATIENT)
Dept: INTERNAL MEDICINE | Facility: CLINIC | Age: 78
End: 2018-07-17

## 2018-07-17 NOTE — TELEPHONE ENCOUNTER
----- Message from Cherelle Tee MD sent at 7/15/2018  5:07 PM EDT -----  Regarding: TVUS  Can we get her ovarian screening results from [facility]? She said it was done in [place]

## 2018-10-18 ENCOUNTER — FLU SHOT (OUTPATIENT)
Dept: INTERNAL MEDICINE | Facility: CLINIC | Age: 78
End: 2018-10-18

## 2018-10-18 PROCEDURE — G0008 ADMIN INFLUENZA VIRUS VAC: HCPCS | Performed by: INTERNAL MEDICINE

## 2018-10-18 PROCEDURE — 90662 IIV NO PRSV INCREASED AG IM: CPT | Performed by: INTERNAL MEDICINE

## 2019-02-12 RX ORDER — LISINOPRIL 10 MG/1
TABLET ORAL
Qty: 90 TABLET | Refills: 0 | Status: SHIPPED | OUTPATIENT
Start: 2019-02-12 | End: 2019-05-13 | Stop reason: SDUPTHER

## 2019-02-12 NOTE — TELEPHONE ENCOUNTER
Yes she is due for a 6 month f/u and labs if she can schedule. Does she have enough until she can come in? If not, I can send in

## 2019-02-12 NOTE — TELEPHONE ENCOUNTER
PN - she is in [place] and will be back at the end of the month.  She does not have enough until she can come in.  Can you send in.

## 2019-03-12 ENCOUNTER — TELEPHONE (OUTPATIENT)
Dept: INTERNAL MEDICINE | Facility: CLINIC | Age: 79
End: 2019-03-12

## 2019-03-12 NOTE — TELEPHONE ENCOUNTER
Pt stopped by office today for labs.  She says Neelam called her last week to stop by for labs. No orders.  I told her I will call her when orders are in.  She stated understanding and will stop by after orders are entered

## 2019-03-13 NOTE — TELEPHONE ENCOUNTER
Sorry for misunderstanding, but she actually she does need a 6month f/u appt to see me too as well as labs. She doesn't have to fast, so if she just wants to schedule a f/u and we can do labs at the appt

## 2019-03-20 ENCOUNTER — OFFICE VISIT (OUTPATIENT)
Dept: INTERNAL MEDICINE | Facility: CLINIC | Age: 79
End: 2019-03-20

## 2019-03-20 VITALS
BODY MASS INDEX: 22.61 KG/M2 | SYSTOLIC BLOOD PRESSURE: 128 MMHG | DIASTOLIC BLOOD PRESSURE: 66 MMHG | HEIGHT: 64 IN | TEMPERATURE: 98.2 F | WEIGHT: 132.4 LBS

## 2019-03-20 DIAGNOSIS — E03.9 ACQUIRED HYPOTHYROIDISM: Primary | ICD-10-CM

## 2019-03-20 DIAGNOSIS — M81.0 AGE-RELATED OSTEOPOROSIS WITHOUT CURRENT PATHOLOGICAL FRACTURE: ICD-10-CM

## 2019-03-20 DIAGNOSIS — Z78.0 POSTMENOPAUSAL: ICD-10-CM

## 2019-03-20 DIAGNOSIS — I10 BENIGN ESSENTIAL HYPERTENSION: ICD-10-CM

## 2019-03-20 LAB
ALBUMIN SERPL-MCNC: 4.32 G/DL (ref 3.2–4.8)
ALBUMIN/GLOB SERPL: 1.9 G/DL (ref 1.5–2.5)
ALP SERPL-CCNC: 66 U/L (ref 25–100)
ALT SERPL W P-5'-P-CCNC: 17 U/L (ref 7–40)
ANION GAP SERPL CALCULATED.3IONS-SCNC: 10 MMOL/L (ref 3–11)
AST SERPL-CCNC: 26 U/L (ref 0–33)
BILIRUB SERPL-MCNC: 0.4 MG/DL (ref 0.3–1.2)
BUN BLD-MCNC: 18 MG/DL (ref 9–23)
BUN/CREAT SERPL: 22.8 (ref 7–25)
CALCIUM SPEC-SCNC: 9.2 MG/DL (ref 8.7–10.4)
CHLORIDE SERPL-SCNC: 105 MMOL/L (ref 99–109)
CO2 SERPL-SCNC: 27 MMOL/L (ref 20–31)
CREAT BLD-MCNC: 0.79 MG/DL (ref 0.6–1.3)
GFR SERPL CREATININE-BSD FRML MDRD: 71 ML/MIN/1.73
GLOBULIN UR ELPH-MCNC: 2.3 GM/DL
GLUCOSE BLD-MCNC: 73 MG/DL (ref 70–100)
POTASSIUM BLD-SCNC: 4.9 MMOL/L (ref 3.5–5.5)
PROT SERPL-MCNC: 6.6 G/DL (ref 5.7–8.2)
SODIUM BLD-SCNC: 142 MMOL/L (ref 132–146)
TSH SERPL DL<=0.05 MIU/L-ACNC: 3.02 MIU/ML (ref 0.35–5.35)

## 2019-03-20 PROCEDURE — 80053 COMPREHEN METABOLIC PANEL: CPT | Performed by: INTERNAL MEDICINE

## 2019-03-20 PROCEDURE — 84443 ASSAY THYROID STIM HORMONE: CPT | Performed by: INTERNAL MEDICINE

## 2019-03-20 PROCEDURE — 99214 OFFICE O/P EST MOD 30 MIN: CPT | Performed by: INTERNAL MEDICINE

## 2019-03-20 NOTE — PROGRESS NOTES
"Subjective   Nicole Ruiz is a 78 y.o. female.     History of Present Illness     Here for f/u on:    Hypothyroid- feels well overall.  Weight is stable.    Hypertension- she checks once a month, and usually similar to what we got today.    Osteoporosis - shge did stop the fosamax over the last 3 months because of stomach irritation.  After being off of it for a few months, she tried again and had the stomach pain again, so has stopped for good.  She had been on forteo years ago.  She thinks she might of been on Actonel in the past to, but does not remember if she had trouble with it    The following portions of the patient's history were reviewed and updated as appropriate: allergies, current medications, past family history, past medical history, past social history, past surgical history and problem list.    Review of Systems   Constitutional: Negative for fever.   Respiratory: Negative for shortness of breath.    Cardiovascular: Negative for chest pain.   Skin: Negative.    Allergic/Immunologic: Negative for immunocompromised state.   Neurological: Negative for seizures, memory problem and confusion.   Psychiatric/Behavioral: Negative for agitation.       Objective   /66 (BP Location: Right arm)   Temp 98.2 °F (36.8 °C) (Temporal)   Ht 162.6 cm (64\")   Wt 60.1 kg (132 lb 6.4 oz)   BMI 22.73 kg/m²   Physical Exam   Constitutional: She is oriented to person, place, and time. She appears well-developed and well-nourished. No distress.   HENT:   Head: Normocephalic and atraumatic.   Eyes: Conjunctivae and EOM are normal. Pupils are equal, round, and reactive to light. Right eye exhibits no discharge. Left eye exhibits no discharge.   Neck: Normal range of motion. Neck supple.   Cardiovascular: Normal rate and regular rhythm.   Pulmonary/Chest: Effort normal and breath sounds normal.   Musculoskeletal: She exhibits no edema.   Neurological: She is alert and oriented to person, place, and time. No cranial " nerve deficit.   Skin: Skin is warm and dry. No rash noted. She is not diaphoretic.   Psychiatric: She has a normal mood and affect. Her behavior is normal. Judgment and thought content normal.   Nursing note and vitals reviewed.        Assessment/Plan   Nicole was seen today for hypertension, hypothyroidism and med refill.    Diagnoses and all orders for this visit:    Acquired hypothyroidism -check today  -     TSH    Benign essential hypertension -good control  -     Comprehensive Metabolic Panel    Age-related osteoporosis without current pathological fracture -off Fosamax because of GI intolerance.  We will recheck DEXA and see how it looks.  We did talk about Reclast today but we will wait until we get the results of the bone density  -     DEXA Bone Density Axial; Future    Postmenopausal  -     DEXA Bone Density Axial; Future

## 2019-03-21 RX ORDER — LEVOTHYROXINE SODIUM 0.05 MG/1
TABLET ORAL
Qty: 90 TABLET | Refills: 1 | Status: SHIPPED | OUTPATIENT
Start: 2019-03-21 | End: 2019-09-04

## 2019-05-13 RX ORDER — LISINOPRIL 10 MG/1
TABLET ORAL
Qty: 90 TABLET | Refills: 1 | Status: SHIPPED | OUTPATIENT
Start: 2019-05-13 | End: 2019-11-09 | Stop reason: SDUPTHER

## 2019-05-13 NOTE — TELEPHONE ENCOUNTER
She was here in February and has a follow up in September.  Does she need to do something to get a refill on lisinopril?

## 2019-08-28 ENCOUNTER — TELEPHONE (OUTPATIENT)
Dept: INTERNAL MEDICINE | Facility: CLINIC | Age: 79
End: 2019-08-28

## 2019-08-28 DIAGNOSIS — I10 BENIGN ESSENTIAL HYPERTENSION: Primary | ICD-10-CM

## 2019-08-28 DIAGNOSIS — E03.9 ACQUIRED HYPOTHYROIDISM: ICD-10-CM

## 2019-08-29 ENCOUNTER — LAB (OUTPATIENT)
Dept: INTERNAL MEDICINE | Facility: CLINIC | Age: 79
End: 2019-08-29

## 2019-08-29 DIAGNOSIS — E03.9 ACQUIRED HYPOTHYROIDISM: ICD-10-CM

## 2019-08-29 DIAGNOSIS — I10 BENIGN ESSENTIAL HYPERTENSION: ICD-10-CM

## 2019-08-29 LAB
ALBUMIN SERPL-MCNC: 4.1 G/DL (ref 3.5–5.2)
ALBUMIN/GLOB SERPL: 1.3 G/DL
ALP SERPL-CCNC: 64 U/L (ref 39–117)
ALT SERPL W P-5'-P-CCNC: 10 U/L (ref 1–33)
ANION GAP SERPL CALCULATED.3IONS-SCNC: 11.7 MMOL/L (ref 5–15)
AST SERPL-CCNC: 23 U/L (ref 1–32)
BASOPHILS # BLD AUTO: 0.02 10*3/MM3 (ref 0–0.2)
BASOPHILS NFR BLD AUTO: 0.6 % (ref 0–1.5)
BILIRUB SERPL-MCNC: 0.6 MG/DL (ref 0.2–1.2)
BUN BLD-MCNC: 21 MG/DL (ref 8–23)
BUN/CREAT SERPL: 22.8 (ref 7–25)
CALCIUM SPEC-SCNC: 9.8 MG/DL (ref 8.6–10.5)
CHLORIDE SERPL-SCNC: 105 MMOL/L (ref 98–107)
CHOLEST SERPL-MCNC: 242 MG/DL (ref 0–200)
CO2 SERPL-SCNC: 25.3 MMOL/L (ref 22–29)
CREAT BLD-MCNC: 0.92 MG/DL (ref 0.57–1)
DEPRECATED RDW RBC AUTO: 48.1 FL (ref 37–54)
EOSINOPHIL # BLD AUTO: 0.15 10*3/MM3 (ref 0–0.4)
EOSINOPHIL NFR BLD AUTO: 4.3 % (ref 0.3–6.2)
ERYTHROCYTE [DISTWIDTH] IN BLOOD BY AUTOMATED COUNT: 13.1 % (ref 12.3–15.4)
GFR SERPL CREATININE-BSD FRML MDRD: 59 ML/MIN/1.73
GLOBULIN UR ELPH-MCNC: 3.1 GM/DL
GLUCOSE BLD-MCNC: 75 MG/DL (ref 65–99)
HCT VFR BLD AUTO: 48.7 % (ref 34–46.6)
HDLC SERPL-MCNC: 113 MG/DL (ref 40–60)
HGB BLD-MCNC: 15.5 G/DL (ref 12–15.9)
IMM GRANULOCYTES # BLD AUTO: 0 10*3/MM3 (ref 0–0.05)
IMM GRANULOCYTES NFR BLD AUTO: 0 % (ref 0–0.5)
LDLC SERPL CALC-MCNC: 114 MG/DL (ref 0–100)
LDLC/HDLC SERPL: 1.01 {RATIO}
LYMPHOCYTES # BLD AUTO: 1.28 10*3/MM3 (ref 0.7–3.1)
LYMPHOCYTES NFR BLD AUTO: 36.8 % (ref 19.6–45.3)
MCH RBC QN AUTO: 31.6 PG (ref 26.6–33)
MCHC RBC AUTO-ENTMCNC: 31.8 G/DL (ref 31.5–35.7)
MCV RBC AUTO: 99.4 FL (ref 79–97)
MONOCYTES # BLD AUTO: 0.35 10*3/MM3 (ref 0.1–0.9)
MONOCYTES NFR BLD AUTO: 10.1 % (ref 5–12)
NEUTROPHILS # BLD AUTO: 1.68 10*3/MM3 (ref 1.7–7)
NEUTROPHILS NFR BLD AUTO: 48.2 % (ref 42.7–76)
NRBC BLD AUTO-RTO: 0 /100 WBC (ref 0–0.2)
PLATELET # BLD AUTO: 195 10*3/MM3 (ref 140–450)
PMV BLD AUTO: 10.7 FL (ref 6–12)
POTASSIUM BLD-SCNC: 4.3 MMOL/L (ref 3.5–5.2)
PROT SERPL-MCNC: 7.2 G/DL (ref 6–8.5)
RBC # BLD AUTO: 4.9 10*6/MM3 (ref 3.77–5.28)
SODIUM BLD-SCNC: 142 MMOL/L (ref 136–145)
TRIGL SERPL-MCNC: 74 MG/DL (ref 0–150)
TSH SERPL DL<=0.05 MIU/L-ACNC: 3.3 UIU/ML (ref 0.27–4.2)
VLDLC SERPL-MCNC: 14.8 MG/DL (ref 5–40)
WBC NRBC COR # BLD: 3.48 10*3/MM3 (ref 3.4–10.8)

## 2019-08-29 PROCEDURE — 85025 COMPLETE CBC W/AUTO DIFF WBC: CPT | Performed by: INTERNAL MEDICINE

## 2019-08-29 PROCEDURE — 80061 LIPID PANEL: CPT | Performed by: INTERNAL MEDICINE

## 2019-08-29 PROCEDURE — 84443 ASSAY THYROID STIM HORMONE: CPT | Performed by: INTERNAL MEDICINE

## 2019-08-29 PROCEDURE — 80053 COMPREHEN METABOLIC PANEL: CPT | Performed by: INTERNAL MEDICINE

## 2019-09-01 PROBLEM — M85.80 OSTEOPENIA: Status: ACTIVE | Noted: 2019-09-01

## 2019-09-02 NOTE — PROGRESS NOTES
"Subjective   Nicole Ruiz is a 79 y.o. female.     History of Present Illness     Here for f/u on:    Hypothyroid - tsh is 3.3. She has felt more tired, and a little more constipated last month. Has felt a little more cold as well    htn - checks monthly and 110-130/70s when she checks    Osteopenia - could not tolerate fosamax (GI side effects). Last DEXA was in '17. Walks regularly but no weights. She does takes tums occasionally for dyspepsia but not bad    The following portions of the patient's history were reviewed and updated as appropriate: allergies, current medications, past family history, past medical history, past social history, past surgical history and problem list.    Review of Systems   Constitutional: Positive for fatigue and unexpected weight loss. Negative for activity change, appetite change and unexpected weight gain.   Gastrointestinal: Positive for constipation. Negative for abdominal distention and abdominal pain (will use tums occasionally).   Endocrine: Positive for cold intolerance.   Allergic/Immunologic: Negative for immunocompromised state.       Objective   /72 (BP Location: Left arm)   Pulse 54   Temp 98.2 °F (36.8 °C) (Temporal)   Ht 162.6 cm (64\")   Wt 57.7 kg (127 lb 3.2 oz)   SpO2 97%   BMI 21.83 kg/m²   Physical Exam   Constitutional: She is oriented to person, place, and time. She appears well-developed and well-nourished. No distress.   HENT:   Head: Normocephalic and atraumatic.   Eyes: Conjunctivae and EOM are normal. Pupils are equal, round, and reactive to light. Right eye exhibits no discharge. Left eye exhibits no discharge.   Neck: Normal range of motion. Neck supple.   Cardiovascular: Normal rate and regular rhythm.   Pulmonary/Chest: Effort normal and breath sounds normal.   Abdominal: Soft. Bowel sounds are normal.   Musculoskeletal: She exhibits no edema.   Neurological: She is alert and oriented to person, place, and time. No cranial nerve deficit. "   Skin: Skin is warm and dry. No rash noted. She is not diaphoretic.   Psychiatric: She has a normal mood and affect. Her behavior is normal. Judgment and thought content normal.   Nursing note and vitals reviewed.        Assessment/Plan   Nicole was seen today for hypertension, hypothyroidism and med refill.    Diagnoses and all orders for this visit:    Benign essential hypertension -good control on lisinopril    Osteopenia, unspecified location -she is off Fosamax because of GI side effects.  We will schedule her DEXA  -     DEXA Bone Density Axial; Future    Acquired hypothyroidism -TSH high normal and patient with some symptoms, so we will go ahead and raise the Synthroid to 75.  Recheck thyroid labs in 3 months  -     levothyroxine (SYNTHROID) 75 MCG tablet; Take 1 tablet by mouth Daily.    Need for immunization against influenza  -     Fluad Tri 65yr ([id number])    Postmenopausal  -     DEXA Bone Density Axial; Future

## 2019-09-04 ENCOUNTER — OFFICE VISIT (OUTPATIENT)
Dept: INTERNAL MEDICINE | Facility: CLINIC | Age: 79
End: 2019-09-04

## 2019-09-04 VITALS
OXYGEN SATURATION: 97 % | WEIGHT: 127.2 LBS | SYSTOLIC BLOOD PRESSURE: 102 MMHG | HEIGHT: 64 IN | HEART RATE: 54 BPM | TEMPERATURE: 98.2 F | DIASTOLIC BLOOD PRESSURE: 72 MMHG | BODY MASS INDEX: 21.72 KG/M2

## 2019-09-04 DIAGNOSIS — Z78.0 POSTMENOPAUSAL: ICD-10-CM

## 2019-09-04 DIAGNOSIS — Z23 NEED FOR IMMUNIZATION AGAINST INFLUENZA: ICD-10-CM

## 2019-09-04 DIAGNOSIS — I10 BENIGN ESSENTIAL HYPERTENSION: Primary | ICD-10-CM

## 2019-09-04 DIAGNOSIS — M85.80 OSTEOPENIA, UNSPECIFIED LOCATION: ICD-10-CM

## 2019-09-04 DIAGNOSIS — E03.9 ACQUIRED HYPOTHYROIDISM: ICD-10-CM

## 2019-09-04 PROCEDURE — G0008 ADMIN INFLUENZA VIRUS VAC: HCPCS | Performed by: INTERNAL MEDICINE

## 2019-09-04 PROCEDURE — 99214 OFFICE O/P EST MOD 30 MIN: CPT | Performed by: INTERNAL MEDICINE

## 2019-09-04 PROCEDURE — 90653 IIV ADJUVANT VACCINE IM: CPT | Performed by: INTERNAL MEDICINE

## 2019-09-04 RX ORDER — LEVOTHYROXINE SODIUM 0.07 MG/1
75 TABLET ORAL DAILY
Qty: 90 TABLET | Refills: 0 | Status: SHIPPED | OUTPATIENT
Start: 2019-09-04 | End: 2019-11-15 | Stop reason: SDUPTHER

## 2019-11-11 RX ORDER — LISINOPRIL 10 MG/1
TABLET ORAL
Qty: 90 TABLET | Refills: 1 | Status: SHIPPED | OUTPATIENT
Start: 2019-11-11 | End: 2020-05-07

## 2019-11-15 DIAGNOSIS — E03.9 ACQUIRED HYPOTHYROIDISM: Primary | ICD-10-CM

## 2019-11-15 DIAGNOSIS — E03.9 ACQUIRED HYPOTHYROIDISM: ICD-10-CM

## 2019-11-15 NOTE — TELEPHONE ENCOUNTER
We had raised her dose in 8/19, so I did want to recheck the thyroid labs. I can put in order if she can stop by

## 2019-11-19 ENCOUNTER — LAB (OUTPATIENT)
Dept: INTERNAL MEDICINE | Facility: CLINIC | Age: 79
End: 2019-11-19

## 2019-11-19 DIAGNOSIS — E03.9 ACQUIRED HYPOTHYROIDISM: ICD-10-CM

## 2019-11-19 PROCEDURE — 84439 ASSAY OF FREE THYROXINE: CPT | Performed by: INTERNAL MEDICINE

## 2019-11-19 PROCEDURE — 84443 ASSAY THYROID STIM HORMONE: CPT | Performed by: INTERNAL MEDICINE

## 2019-11-20 LAB
T4 FREE SERPL-MCNC: 1.37 NG/DL (ref 0.93–1.7)
TSH SERPL DL<=0.05 MIU/L-ACNC: 3.93 UIU/ML (ref 0.27–4.2)

## 2019-11-20 RX ORDER — LEVOTHYROXINE SODIUM 0.07 MG/1
TABLET ORAL
Qty: 90 TABLET | Refills: 1 | Status: SHIPPED | OUTPATIENT
Start: 2019-11-20 | End: 2020-06-06 | Stop reason: SDUPTHER

## 2019-11-25 ENCOUNTER — HOSPITAL ENCOUNTER (OUTPATIENT)
Dept: BONE DENSITY | Facility: HOSPITAL | Age: 79
Discharge: HOME | End: 2019-11-25
Admitting: INTERNAL MEDICINE

## 2019-11-25 DIAGNOSIS — Z78.0 POSTMENOPAUSAL: ICD-10-CM

## 2019-11-25 DIAGNOSIS — M85.80 OSTEOPENIA, UNSPECIFIED LOCATION: ICD-10-CM

## 2019-11-25 PROCEDURE — 77080 DXA BONE DENSITY AXIAL: CPT

## 2019-12-04 RX ORDER — SODIUM CHLORIDE 9 MG/ML
250 INJECTION, SOLUTION INTRAVENOUS ONCE
Status: CANCELLED | OUTPATIENT
Start: 2019-12-04

## 2019-12-12 RX ORDER — ZOLEDRONIC ACID 5 MG/100ML
5 INJECTION, SOLUTION INTRAVENOUS ONCE
Status: CANCELLED | OUTPATIENT
Start: 2019-12-12

## 2019-12-30 ENCOUNTER — INFUSION (OUTPATIENT)
Dept: ONCOLOGY | Facility: HOSPITAL | Age: 79
End: 2019-12-30

## 2019-12-30 VITALS
RESPIRATION RATE: 16 BRPM | BODY MASS INDEX: 21.63 KG/M2 | SYSTOLIC BLOOD PRESSURE: 147 MMHG | DIASTOLIC BLOOD PRESSURE: 87 MMHG | WEIGHT: 126 LBS | TEMPERATURE: 98.9 F | HEART RATE: 79 BPM

## 2019-12-30 DIAGNOSIS — M81.0 AGE-RELATED OSTEOPOROSIS WITHOUT CURRENT PATHOLOGICAL FRACTURE: Primary | ICD-10-CM

## 2019-12-30 LAB
CREAT BLDA-MCNC: 1 MG/DL (ref 0.6–1.3)
CREAT SERPL-MCNC: 1 MG/DL

## 2019-12-30 PROCEDURE — 96374 THER/PROPH/DIAG INJ IV PUSH: CPT

## 2019-12-30 PROCEDURE — 25010000002 ZOLEDRONIC ACID 5 MG/100ML SOLUTION: Performed by: INTERNAL MEDICINE

## 2019-12-30 PROCEDURE — 96365 THER/PROPH/DIAG IV INF INIT: CPT

## 2019-12-30 PROCEDURE — 82565 ASSAY OF CREATININE: CPT

## 2019-12-30 RX ORDER — ZOLEDRONIC ACID 5 MG/100ML
5 INJECTION, SOLUTION INTRAVENOUS ONCE
Status: COMPLETED | OUTPATIENT
Start: 2019-12-30 | End: 2019-12-30

## 2019-12-30 RX ORDER — ZOLEDRONIC ACID 5 MG/100ML
5 INJECTION, SOLUTION INTRAVENOUS ONCE
Status: CANCELLED | OUTPATIENT
Start: 2020-01-07

## 2019-12-30 RX ADMIN — ZOLEDRONIC ACID 5 MG: 5 INJECTION, SOLUTION INTRAVENOUS at 13:18

## 2020-01-23 ENCOUNTER — TELEPHONE (OUTPATIENT)
Dept: INTERNAL MEDICINE | Facility: CLINIC | Age: 80
End: 2020-01-23

## 2020-01-23 RX ORDER — OMEPRAZOLE 20 MG/1
20 CAPSULE, DELAYED RELEASE ORAL DAILY
Qty: 30 CAPSULE | Refills: 5 | Status: SHIPPED | OUTPATIENT
Start: 2020-01-23 | End: 2021-04-22

## 2020-05-07 RX ORDER — LISINOPRIL 10 MG/1
TABLET ORAL
Qty: 90 TABLET | Refills: 0 | Status: SHIPPED | OUTPATIENT
Start: 2020-05-07 | End: 2020-08-05

## 2020-06-01 ENCOUNTER — OFFICE VISIT (OUTPATIENT)
Dept: INTERNAL MEDICINE | Facility: CLINIC | Age: 80
End: 2020-06-01

## 2020-06-01 VITALS
DIASTOLIC BLOOD PRESSURE: 84 MMHG | BODY MASS INDEX: 20.83 KG/M2 | HEART RATE: 92 BPM | WEIGHT: 122 LBS | SYSTOLIC BLOOD PRESSURE: 138 MMHG | OXYGEN SATURATION: 98 % | TEMPERATURE: 97.8 F | HEIGHT: 64 IN

## 2020-06-01 DIAGNOSIS — M81.0 AGE-RELATED OSTEOPOROSIS WITHOUT CURRENT PATHOLOGICAL FRACTURE: ICD-10-CM

## 2020-06-01 DIAGNOSIS — Z00.00 MEDICARE ANNUAL WELLNESS VISIT, SUBSEQUENT: Primary | ICD-10-CM

## 2020-06-01 DIAGNOSIS — I10 BENIGN ESSENTIAL HYPERTENSION: ICD-10-CM

## 2020-06-01 DIAGNOSIS — E03.9 ACQUIRED HYPOTHYROIDISM: ICD-10-CM

## 2020-06-01 DIAGNOSIS — F51.01 PRIMARY INSOMNIA: ICD-10-CM

## 2020-06-01 DIAGNOSIS — Z00.00 ROUTINE GENERAL MEDICAL EXAMINATION AT A HEALTH CARE FACILITY: ICD-10-CM

## 2020-06-01 DIAGNOSIS — F41.9 ANXIETY: ICD-10-CM

## 2020-06-01 LAB
BILIRUB BLD-MCNC: NEGATIVE MG/DL
CLARITY, POC: CLEAR
COLOR UR: YELLOW
GLUCOSE UR STRIP-MCNC: NEGATIVE MG/DL
KETONES UR QL: NEGATIVE
LEUKOCYTE EST, POC: NEGATIVE
NITRITE UR-MCNC: NEGATIVE MG/ML
PH UR: 5.5 [PH] (ref 5–8)
PROT UR STRIP-MCNC: NEGATIVE MG/DL
RBC # UR STRIP: ABNORMAL /UL
SP GR UR: 1.03 (ref 1–1.03)
UROBILINOGEN UR QL: NORMAL

## 2020-06-01 PROCEDURE — G0439 PPPS, SUBSEQ VISIT: HCPCS | Performed by: INTERNAL MEDICINE

## 2020-06-01 PROCEDURE — 81003 URINALYSIS AUTO W/O SCOPE: CPT | Performed by: INTERNAL MEDICINE

## 2020-06-01 PROCEDURE — 99397 PER PM REEVAL EST PAT 65+ YR: CPT | Performed by: INTERNAL MEDICINE

## 2020-06-01 RX ORDER — DIAZEPAM 5 MG/1
5 TABLET ORAL NIGHTLY PRN
Qty: 30 TABLET | Refills: 2 | Status: SHIPPED | OUTPATIENT
Start: 2020-06-01 | End: 2021-05-17 | Stop reason: SDUPTHER

## 2020-06-03 ENCOUNTER — TELEPHONE (OUTPATIENT)
Dept: INTERNAL MEDICINE | Facility: CLINIC | Age: 80
End: 2020-06-03

## 2020-06-03 NOTE — TELEPHONE ENCOUNTER
Contacted [facility]. Last mammogram done in October. Mariaelena will fax report to us.

## 2020-06-05 ENCOUNTER — PRIOR AUTHORIZATION (OUTPATIENT)
Dept: INTERNAL MEDICINE | Facility: CLINIC | Age: 80
End: 2020-06-05

## 2020-06-05 ENCOUNTER — LAB (OUTPATIENT)
Dept: LAB | Facility: HOSPITAL | Age: 80
End: 2020-06-05

## 2020-06-05 DIAGNOSIS — I10 BENIGN ESSENTIAL HYPERTENSION: ICD-10-CM

## 2020-06-05 DIAGNOSIS — M81.0 AGE-RELATED OSTEOPOROSIS WITHOUT CURRENT PATHOLOGICAL FRACTURE: ICD-10-CM

## 2020-06-05 DIAGNOSIS — E03.9 ACQUIRED HYPOTHYROIDISM: ICD-10-CM

## 2020-06-05 LAB
25(OH)D3 SERPL-MCNC: 50.5 NG/ML (ref 30–100)
ALBUMIN SERPL-MCNC: 4.3 G/DL (ref 3.5–5.2)
ALBUMIN/GLOB SERPL: 1.4 G/DL
ALP SERPL-CCNC: 53 U/L (ref 39–117)
ALT SERPL W P-5'-P-CCNC: 11 U/L (ref 1–33)
ANION GAP SERPL CALCULATED.3IONS-SCNC: 7.3 MMOL/L (ref 5–15)
AST SERPL-CCNC: 22 U/L (ref 1–32)
BASOPHILS # BLD AUTO: 0.03 10*3/MM3 (ref 0–0.2)
BASOPHILS NFR BLD AUTO: 0.8 % (ref 0–1.5)
BILIRUB SERPL-MCNC: 0.5 MG/DL (ref 0.2–1.2)
BUN BLD-MCNC: 24 MG/DL (ref 8–23)
BUN/CREAT SERPL: 25.8 (ref 7–25)
CALCIUM SPEC-SCNC: 9.2 MG/DL (ref 8.6–10.5)
CHLORIDE SERPL-SCNC: 106 MMOL/L (ref 98–107)
CHOLEST SERPL-MCNC: 224 MG/DL (ref 0–200)
CO2 SERPL-SCNC: 26.7 MMOL/L (ref 22–29)
CREAT BLD-MCNC: 0.93 MG/DL (ref 0.57–1)
DEPRECATED RDW RBC AUTO: 42.6 FL (ref 37–54)
EOSINOPHIL # BLD AUTO: 0.14 10*3/MM3 (ref 0–0.4)
EOSINOPHIL NFR BLD AUTO: 3.6 % (ref 0.3–6.2)
ERYTHROCYTE [DISTWIDTH] IN BLOOD BY AUTOMATED COUNT: 12.4 % (ref 12.3–15.4)
GFR SERPL CREATININE-BSD FRML MDRD: 58 ML/MIN/1.73
GLOBULIN UR ELPH-MCNC: 3 GM/DL
GLUCOSE BLD-MCNC: 92 MG/DL (ref 65–99)
HCT VFR BLD AUTO: 44.9 % (ref 34–46.6)
HDLC SERPL-MCNC: 112 MG/DL (ref 40–60)
HGB BLD-MCNC: 15.6 G/DL (ref 12–15.9)
IMM GRANULOCYTES # BLD AUTO: 0.01 10*3/MM3 (ref 0–0.05)
IMM GRANULOCYTES NFR BLD AUTO: 0.3 % (ref 0–0.5)
LDLC SERPL CALC-MCNC: 99 MG/DL (ref 0–100)
LDLC/HDLC SERPL: 0.89 {RATIO}
LYMPHOCYTES # BLD AUTO: 1.46 10*3/MM3 (ref 0.7–3.1)
LYMPHOCYTES NFR BLD AUTO: 37.8 % (ref 19.6–45.3)
MCH RBC QN AUTO: 32.4 PG (ref 26.6–33)
MCHC RBC AUTO-ENTMCNC: 34.7 G/DL (ref 31.5–35.7)
MCV RBC AUTO: 93.2 FL (ref 79–97)
MONOCYTES # BLD AUTO: 0.39 10*3/MM3 (ref 0.1–0.9)
MONOCYTES NFR BLD AUTO: 10.1 % (ref 5–12)
NEUTROPHILS # BLD AUTO: 1.83 10*3/MM3 (ref 1.7–7)
NEUTROPHILS NFR BLD AUTO: 47.4 % (ref 42.7–76)
NRBC BLD AUTO-RTO: 0 /100 WBC (ref 0–0.2)
PLATELET # BLD AUTO: 190 10*3/MM3 (ref 140–450)
PMV BLD AUTO: 10.4 FL (ref 6–12)
POTASSIUM BLD-SCNC: 4.5 MMOL/L (ref 3.5–5.2)
PROT SERPL-MCNC: 7.3 G/DL (ref 6–8.5)
RBC # BLD AUTO: 4.82 10*6/MM3 (ref 3.77–5.28)
SODIUM BLD-SCNC: 140 MMOL/L (ref 136–145)
TRIGL SERPL-MCNC: 63 MG/DL (ref 0–150)
TSH SERPL DL<=0.05 MIU/L-ACNC: 2.12 UIU/ML (ref 0.27–4.2)
VLDLC SERPL-MCNC: 12.6 MG/DL (ref 5–40)
WBC NRBC COR # BLD: 3.86 10*3/MM3 (ref 3.4–10.8)

## 2020-06-05 PROCEDURE — 82306 VITAMIN D 25 HYDROXY: CPT

## 2020-06-05 PROCEDURE — 80061 LIPID PANEL: CPT

## 2020-06-05 PROCEDURE — 80053 COMPREHEN METABOLIC PANEL: CPT

## 2020-06-05 PROCEDURE — 85025 COMPLETE CBC W/AUTO DIFF WBC: CPT

## 2020-06-05 PROCEDURE — 84443 ASSAY THYROID STIM HORMONE: CPT

## 2020-06-06 DIAGNOSIS — E03.9 ACQUIRED HYPOTHYROIDISM: ICD-10-CM

## 2020-06-06 RX ORDER — LEVOTHYROXINE SODIUM 0.07 MG/1
75 TABLET ORAL DAILY
Qty: 90 TABLET | Refills: 1 | Status: SHIPPED | OUTPATIENT
Start: 2020-06-06 | End: 2021-05-18

## 2020-06-09 ENCOUNTER — TELEPHONE (OUTPATIENT)
Dept: INTERNAL MEDICINE | Facility: CLINIC | Age: 80
End: 2020-06-09

## 2020-06-09 NOTE — TELEPHONE ENCOUNTER
Insurance had denied her diazepam. Checked with pharmacy to see if insurance had given alternatives but it did not.

## 2020-06-09 NOTE — TELEPHONE ENCOUNTER
Can you let her know, she can just pay out of pocket for the diazepam - I think 30 tablets is about $15

## 2020-08-05 RX ORDER — LISINOPRIL 10 MG/1
TABLET ORAL
Qty: 90 TABLET | Refills: 0 | Status: SHIPPED | OUTPATIENT
Start: 2020-08-05 | End: 2020-11-03

## 2020-11-03 NOTE — TELEPHONE ENCOUNTER
PN she is due for a follow up with Dr. Tee.  I have scheduled this for 11/16/20 at 12:45 p.m.  She has enough of the lisinopril for 3 weeks.

## 2020-11-13 NOTE — PROGRESS NOTES
Subjective   Nicole Ruiz is a 80 y.o. female.     History of Present Illness     Here for f/u on:    Hypertension-she is on lisinopril. She did check last week and was a little high - 138 on top -but other than last week she has not been checking regularly.    Hypothyroid-she is on Synthroid and feels well.  Weight is stable.  Energy level is good    Anxiety-does use Valium occasionally.  Last filled 2 months ago but still has and does not need a refill currently.  She has felt stressed with COVID-19 but feels like she has managed it well    Osteoporosis -she did get Reclast last year but had achiness for about 6 months so does not want to get it again.  It is due next month.  She does take calcium/D and her vitamin D level look good in June.  She does walk regularly on treadmill and does bands for resistance work    Current Outpatient Medications on File Prior to Visit   Medication Sig Dispense Refill   • diazePAM (VALIUM) 5 MG tablet Take 1 tablet by mouth At Night As Needed for Anxiety. 30 tablet 2   • levothyroxine (SYNTHROID, LEVOTHROID) 75 MCG tablet Take 1 tablet by mouth Daily. 90 tablet 1   • lisinopril (PRINIVIL,ZESTRIL) 10 MG tablet TAKE 1 TABLET DAILY (PLEASE SCHEDULE AN APPOINTMENT) 90 tablet 0   • omeprazole (priLOSEC) 20 MG capsule Take 1 capsule by mouth Daily. 30 capsule 5     No current facility-administered medications on file prior to visit.        The following portions of the patient's history were reviewed and updated as appropriate: allergies, current medications, past family history, past medical history, past social history, past surgical history and problem list.    Review of Systems   Constitutional: Negative for appetite change, fatigue, fever, unexpected weight gain and unexpected weight loss.   Respiratory: Negative for shortness of breath.    Cardiovascular: Negative for chest pain.   Musculoskeletal: Positive for arthralgias (better) and back pain (upper back pain but better w/  "massage roller).   Allergic/Immunologic: Negative for immunocompromised state.   Neurological: Negative for seizures, syncope, speech difficulty and memory problem.   Psychiatric/Behavioral: Positive for stress. Negative for sleep disturbance.       Objective   /82 (BP Location: Left arm, Patient Position: Sitting)   Pulse 67   Temp 97.1 °F (36.2 °C) (Infrared)   Ht 162.6 cm (64\")   Wt 55.2 kg (121 lb 12.8 oz)   SpO2 96%   BMI 20.91 kg/m²   Physical Exam  Vitals signs and nursing note reviewed.   Constitutional:       General: She is not in acute distress.     Appearance: Normal appearance. She is well-developed and normal weight. She is not diaphoretic.   HENT:      Head: Normocephalic and atraumatic.   Eyes:      General:         Right eye: No discharge.         Left eye: No discharge.      Conjunctiva/sclera: Conjunctivae normal.      Pupils: Pupils are equal, round, and reactive to light.   Neck:      Musculoskeletal: Normal range of motion and neck supple.   Cardiovascular:      Rate and Rhythm: Normal rate and regular rhythm.   Pulmonary:      Effort: Pulmonary effort is normal.      Breath sounds: Normal breath sounds.   Skin:     General: Skin is warm and dry.      Findings: No rash.   Neurological:      Mental Status: She is alert and oriented to person, place, and time.      Cranial Nerves: No cranial nerve deficit.   Psychiatric:         Mood and Affect: Mood normal.         Behavior: Behavior normal.         Thought Content: Thought content normal.         Judgment: Judgment normal.           Assessment/Plan     HTN -good here today.  She will start monitoring more regularly.  Check BMP today    Hypothyroid-check TSH today.    Osteoporosis-patient had trouble tolerating Reclast.  We will recheck her DEXA in 1 year and depending on how this looks decide what to do.  She will continue weightbearing exercise and calcium vitamin D      Anxiety-she uses Valium as needed.  Does not need a refill " today.Pt has already signed controlled substance contract. Michael done today and appropriate.       Preventative - pneumovax booster today  Mammogram -has not had yet this year.  She will call [facility] and schedule if it is due  dexa next year  Flu shot-had  Hep C screen today

## 2020-11-16 ENCOUNTER — LAB (OUTPATIENT)
Dept: LAB | Facility: HOSPITAL | Age: 80
End: 2020-11-16

## 2020-11-16 ENCOUNTER — OFFICE VISIT (OUTPATIENT)
Dept: INTERNAL MEDICINE | Facility: CLINIC | Age: 80
End: 2020-11-16

## 2020-11-16 VITALS
HEIGHT: 64 IN | TEMPERATURE: 97.1 F | OXYGEN SATURATION: 96 % | BODY MASS INDEX: 20.79 KG/M2 | HEART RATE: 67 BPM | DIASTOLIC BLOOD PRESSURE: 82 MMHG | WEIGHT: 121.8 LBS | SYSTOLIC BLOOD PRESSURE: 128 MMHG

## 2020-11-16 DIAGNOSIS — E03.9 ACQUIRED HYPOTHYROIDISM: ICD-10-CM

## 2020-11-16 DIAGNOSIS — M81.0 AGE-RELATED OSTEOPOROSIS WITHOUT CURRENT PATHOLOGICAL FRACTURE: ICD-10-CM

## 2020-11-16 DIAGNOSIS — F51.01 PRIMARY INSOMNIA: ICD-10-CM

## 2020-11-16 DIAGNOSIS — I10 BENIGN ESSENTIAL HYPERTENSION: ICD-10-CM

## 2020-11-16 DIAGNOSIS — Z23 NEED FOR PNEUMOCOCCAL VACCINATION: ICD-10-CM

## 2020-11-16 DIAGNOSIS — Z11.59 NEED FOR HEPATITIS C SCREENING TEST: ICD-10-CM

## 2020-11-16 DIAGNOSIS — I10 BENIGN ESSENTIAL HYPERTENSION: Primary | ICD-10-CM

## 2020-11-16 LAB
ANION GAP SERPL CALCULATED.3IONS-SCNC: 5.2 MMOL/L (ref 5–15)
BUN SERPL-MCNC: 19 MG/DL (ref 8–23)
BUN/CREAT SERPL: 21.6 (ref 7–25)
CALCIUM SPEC-SCNC: 9.9 MG/DL (ref 8.6–10.5)
CHLORIDE SERPL-SCNC: 103 MMOL/L (ref 98–107)
CO2 SERPL-SCNC: 30.8 MMOL/L (ref 22–29)
CREAT SERPL-MCNC: 0.88 MG/DL (ref 0.57–1)
GFR SERPL CREATININE-BSD FRML MDRD: 62 ML/MIN/1.73
GLUCOSE SERPL-MCNC: 84 MG/DL (ref 65–99)
HCV AB SER DONR QL: NORMAL
POTASSIUM SERPL-SCNC: 4.3 MMOL/L (ref 3.5–5.2)
SODIUM SERPL-SCNC: 139 MMOL/L (ref 136–145)
TSH SERPL DL<=0.05 MIU/L-ACNC: 2.39 UIU/ML (ref 0.27–4.2)

## 2020-11-16 PROCEDURE — 36415 COLL VENOUS BLD VENIPUNCTURE: CPT

## 2020-11-16 PROCEDURE — 84443 ASSAY THYROID STIM HORMONE: CPT | Performed by: INTERNAL MEDICINE

## 2020-11-16 PROCEDURE — 80048 BASIC METABOLIC PNL TOTAL CA: CPT | Performed by: INTERNAL MEDICINE

## 2020-11-16 PROCEDURE — 99214 OFFICE O/P EST MOD 30 MIN: CPT | Performed by: INTERNAL MEDICINE

## 2020-11-16 PROCEDURE — 90732 PPSV23 VACC 2 YRS+ SUBQ/IM: CPT | Performed by: INTERNAL MEDICINE

## 2020-11-16 PROCEDURE — 86803 HEPATITIS C AB TEST: CPT | Performed by: INTERNAL MEDICINE

## 2020-11-16 PROCEDURE — G0009 ADMIN PNEUMOCOCCAL VACCINE: HCPCS | Performed by: INTERNAL MEDICINE

## 2020-11-16 RX ORDER — DIAZEPAM 5 MG/1
5 TABLET ORAL NIGHTLY PRN
Qty: 30 TABLET | Refills: 2 | Status: CANCELLED | OUTPATIENT
Start: 2020-11-16

## 2020-11-17 RX ORDER — LISINOPRIL 10 MG/1
TABLET ORAL
Qty: 90 TABLET | Refills: 1 | Status: SHIPPED | OUTPATIENT
Start: 2020-11-17 | End: 2021-05-18

## 2021-04-22 RX ORDER — OMEPRAZOLE 20 MG/1
CAPSULE, DELAYED RELEASE ORAL
Qty: 30 CAPSULE | Refills: 11 | Status: SHIPPED | OUTPATIENT
Start: 2021-04-22 | End: 2022-04-20

## 2021-05-01 DIAGNOSIS — E03.9 ACQUIRED HYPOTHYROIDISM: ICD-10-CM

## 2021-05-17 ENCOUNTER — LAB (OUTPATIENT)
Dept: LAB | Facility: HOSPITAL | Age: 81
End: 2021-05-17

## 2021-05-17 ENCOUNTER — OFFICE VISIT (OUTPATIENT)
Dept: INTERNAL MEDICINE | Facility: CLINIC | Age: 81
End: 2021-05-17

## 2021-05-17 VITALS
HEART RATE: 64 BPM | OXYGEN SATURATION: 98 % | TEMPERATURE: 97.3 F | BODY MASS INDEX: 20.35 KG/M2 | DIASTOLIC BLOOD PRESSURE: 76 MMHG | HEIGHT: 64 IN | SYSTOLIC BLOOD PRESSURE: 126 MMHG | WEIGHT: 119.2 LBS

## 2021-05-17 DIAGNOSIS — E03.9 ACQUIRED HYPOTHYROIDISM: ICD-10-CM

## 2021-05-17 DIAGNOSIS — E03.9 ACQUIRED HYPOTHYROIDISM: Chronic | ICD-10-CM

## 2021-05-17 DIAGNOSIS — I10 BENIGN ESSENTIAL HYPERTENSION: Primary | Chronic | ICD-10-CM

## 2021-05-17 DIAGNOSIS — F51.01 PRIMARY INSOMNIA: Chronic | ICD-10-CM

## 2021-05-17 DIAGNOSIS — I10 BENIGN ESSENTIAL HYPERTENSION: ICD-10-CM

## 2021-05-17 LAB
ALBUMIN SERPL-MCNC: 4.2 G/DL (ref 3.5–5.2)
ALBUMIN/GLOB SERPL: 1.4 G/DL
ALP SERPL-CCNC: 63 U/L (ref 39–117)
ALT SERPL W P-5'-P-CCNC: 9 U/L (ref 1–33)
ANION GAP SERPL CALCULATED.3IONS-SCNC: 8.1 MMOL/L (ref 5–15)
AST SERPL-CCNC: 21 U/L (ref 1–32)
BILIRUB SERPL-MCNC: 0.4 MG/DL (ref 0–1.2)
BUN SERPL-MCNC: 21 MG/DL (ref 8–23)
BUN/CREAT SERPL: 27.6 (ref 7–25)
CALCIUM SPEC-SCNC: 9.6 MG/DL (ref 8.6–10.5)
CHLORIDE SERPL-SCNC: 105 MMOL/L (ref 98–107)
CO2 SERPL-SCNC: 26.9 MMOL/L (ref 22–29)
CREAT SERPL-MCNC: 0.76 MG/DL (ref 0.57–1)
DEPRECATED RDW RBC AUTO: 42.2 FL (ref 37–54)
ERYTHROCYTE [DISTWIDTH] IN BLOOD BY AUTOMATED COUNT: 12.1 % (ref 12.3–15.4)
GFR SERPL CREATININE-BSD FRML MDRD: 73 ML/MIN/1.73
GLOBULIN UR ELPH-MCNC: 3.1 GM/DL
GLUCOSE SERPL-MCNC: 78 MG/DL (ref 65–99)
HCT VFR BLD AUTO: 45.5 % (ref 34–46.6)
HGB BLD-MCNC: 15.5 G/DL (ref 12–15.9)
MCH RBC QN AUTO: 32.5 PG (ref 26.6–33)
MCHC RBC AUTO-ENTMCNC: 34.1 G/DL (ref 31.5–35.7)
MCV RBC AUTO: 95.4 FL (ref 79–97)
PLATELET # BLD AUTO: 193 10*3/MM3 (ref 140–450)
PMV BLD AUTO: 10.4 FL (ref 6–12)
POTASSIUM SERPL-SCNC: 4.3 MMOL/L (ref 3.5–5.2)
PROT SERPL-MCNC: 7.3 G/DL (ref 6–8.5)
RBC # BLD AUTO: 4.77 10*6/MM3 (ref 3.77–5.28)
SODIUM SERPL-SCNC: 140 MMOL/L (ref 136–145)
WBC # BLD AUTO: 4.05 10*3/MM3 (ref 3.4–10.8)

## 2021-05-17 PROCEDURE — 84443 ASSAY THYROID STIM HORMONE: CPT | Performed by: INTERNAL MEDICINE

## 2021-05-17 PROCEDURE — 99214 OFFICE O/P EST MOD 30 MIN: CPT | Performed by: INTERNAL MEDICINE

## 2021-05-17 PROCEDURE — 85027 COMPLETE CBC AUTOMATED: CPT | Performed by: INTERNAL MEDICINE

## 2021-05-17 PROCEDURE — 80053 COMPREHEN METABOLIC PANEL: CPT | Performed by: INTERNAL MEDICINE

## 2021-05-17 RX ORDER — DIAZEPAM 5 MG/1
5 TABLET ORAL NIGHTLY PRN
Qty: 30 TABLET | Refills: 2 | Status: SHIPPED | OUTPATIENT
Start: 2021-05-17 | End: 2022-05-25 | Stop reason: SDUPTHER

## 2021-05-17 NOTE — PROGRESS NOTES
"Chief Complaint  Hypothyroidism (follow up), Hypertension, and Med Refill    Subjective          Nicole Ruiz presents to [facility]  History of Present Illness    Hypertension-she is on lisinopril. She does check about every 2 weeks and gets readings similar to what we got today.    Hypothyroid-she is on Synthroid and feels well.  Weight is down a few pounds.  BM regular.Energy level is good     Anxiety-does use Valium occasionally- maybe 2-3x/week, usually 1/2 tablet at a time and does help with anxiety and sleep         Current Outpatient Medications:   •  diazePAM (VALIUM) 5 MG tablet, Take 1 tablet by mouth At Night As Needed for Anxiety., Disp: 30 tablet, Rfl: 2  •  levothyroxine (SYNTHROID, LEVOTHROID) 75 MCG tablet, Take 1 tablet by mouth Daily., Disp: 90 tablet, Rfl: 1  •  lisinopril (PRINIVIL,ZESTRIL) 10 MG tablet, TAKE 1 TABLET DAILY (PLEASE SCHEDULE AN APPOINTMENT), Disp: 90 tablet, Rfl: 1  •  omeprazole (priLOSEC) 20 MG capsule, TAKE ONE CAPSULE BY MOUTH DAILY, Disp: 30 capsule, Rfl: 11      Objective   Vital Signs:   /76 (BP Location: Right arm, Patient Position: Sitting)   Pulse 64   Temp 97.3 °F (36.3 °C) (Infrared)   Ht 162.6 cm (64\")   Wt 54.1 kg (119 lb 3.2 oz)   SpO2 98%   BMI 20.46 kg/m²       Physical exam  Constitutional: oriented to person, place, and time.  well-developed and well-nourished. No distress.   HENT:   Head: Normocephalic and atraumatic.   Eyes: Conjunctivae and EOM are normal.   Cardiovascular: Normal rate, regular rhythm and normal heart sounds.  Exam reveals no gallop and no friction rub.    No murmur heard.  Pulmonary/Chest: Effort normal and breath sounds normal. No respiratory distress.   no wheezes.   Neurological:  alert and oriented to person, place, and time.   Skin: Skin is warm and dry. not diaphoretic.   Psychiatric:  normal mood and affect. behavior is normal. Judgment and thought content normal.      Result Review : "                   Assessment and Plan    Diagnoses and all orders for this visit:    1. Benign essential hypertension (Primary)  Comments:  Good control on lisinopril  Orders:  -     Comprehensive Metabolic Panel; Future  -     CBC (No Diff); Future    2. Primary insomnia  Comments:  Stable on as needed Valium.  She has signed Michael FERNANDEZ appropriate.  Refill today  Orders:  -     diazePAM (VALIUM) 5 MG tablet; Take 1 tablet by mouth At Night As Needed for Anxiety.  Dispense: 30 tablet; Refill: 2    3. Acquired hypothyroidism  Comments:  Check levels today.  I gave her some samples of 75 and we will send in refill once labs are back  Orders:  -     TSH; Future        Follow Up   Return in about 6 months (around 11/17/2021) for Medicare Wellness.  Patient was given instructions and counseling regarding her condition or for health maintenance advice. Please see specific information pulled into the AVS if appropriate.

## 2021-05-18 LAB — TSH SERPL DL<=0.05 MIU/L-ACNC: 1.44 UIU/ML (ref 0.27–4.2)

## 2021-05-18 RX ORDER — LEVOTHYROXINE SODIUM 0.07 MG/1
TABLET ORAL
Qty: 90 TABLET | Refills: 3 | Status: SHIPPED | OUTPATIENT
Start: 2021-05-18 | End: 2022-05-13

## 2021-07-07 ENCOUNTER — OFFICE VISIT (OUTPATIENT)
Dept: INTERNAL MEDICINE | Facility: CLINIC | Age: 81
End: 2021-07-07

## 2021-07-07 ENCOUNTER — HOSPITAL ENCOUNTER (OUTPATIENT)
Dept: GENERAL RADIOLOGY | Facility: HOSPITAL | Age: 81
Discharge: HOME | End: 2021-07-07
Admitting: PHYSICIAN ASSISTANT

## 2021-07-07 VITALS
DIASTOLIC BLOOD PRESSURE: 84 MMHG | HEIGHT: 64 IN | WEIGHT: 119.2 LBS | TEMPERATURE: 97.3 F | HEART RATE: 65 BPM | SYSTOLIC BLOOD PRESSURE: 122 MMHG | RESPIRATION RATE: 20 BRPM | BODY MASS INDEX: 20.35 KG/M2 | OXYGEN SATURATION: 98 %

## 2021-07-07 DIAGNOSIS — M25.561 CHRONIC PAIN OF RIGHT KNEE: ICD-10-CM

## 2021-07-07 DIAGNOSIS — G89.29 CHRONIC PAIN OF RIGHT KNEE: ICD-10-CM

## 2021-07-07 DIAGNOSIS — I10 BENIGN ESSENTIAL HYPERTENSION: ICD-10-CM

## 2021-07-07 DIAGNOSIS — H25.9 SENILE CATARACT OF RIGHT EYE, UNSPECIFIED AGE-RELATED CATARACT TYPE: Primary | ICD-10-CM

## 2021-07-07 DIAGNOSIS — E03.9 ACQUIRED HYPOTHYROIDISM: ICD-10-CM

## 2021-07-07 PROCEDURE — 73560 X-RAY EXAM OF KNEE 1 OR 2: CPT

## 2021-07-07 PROCEDURE — 99214 OFFICE O/P EST MOD 30 MIN: CPT | Performed by: PHYSICIAN ASSISTANT

## 2021-07-07 NOTE — PROGRESS NOTES
Chief Complaint  Pre-op Exam    Subjective     {CC  Problem List  Visit Diagnosis   Encounters  Notes  Medications  Labs  Result Review Imaging  Media :23}     History of Present Illness  Nicole Ruiz presents to [facility] for surgical pre-op exam and consultation for cataract-right eye surgery performed by Dr Perez on July 21, through [facility].  No personal or family history of problems with anesthesia.   Patients bleeding risk: no recent abnormal bleeding and no remote history of abnormal bleeding. No personal or family history of blood clots, bleeding or clotting disorders.  Patient does not have objections to receiving blood products if needed.  Patient can walk up 2 flights of stairs or walk 2 blocks without CP or SOA.   Has hx of cataract in left eye as well that was successfully treated with surgery. She was put under anesthesia for that surgery however this time they said they will be doing this with local anesthesia only.     Right knee pain:  Has been bothering her for the last 6 months. No hx of arthritis. It will come and go but is getting worse. She will take Advil and that helps. It does swell up sometimes but no redness. No pain with pressing on it. It flares up if she is not very active for a few days. She walks on the treadmill at night most nights, does not have pain with exercise. Does not have morning stiffness, but will ache in the night sometimes.       Review of Systems   Constitutional: Negative for fever and unexpected weight loss.   HENT: Negative for congestion, rhinorrhea and sore throat.    Respiratory: Negative for cough, shortness of breath and wheezing.    Cardiovascular: Negative for chest pain and palpitations.   Gastrointestinal: Negative for abdominal pain, diarrhea, nausea and vomiting.   Genitourinary: Negative for dysuria and frequency.   Musculoskeletal: Positive for arthralgias. Negative for gait problem.   Skin: Negative for  rash.   Neurological: Negative for dizziness, syncope and headache.       The following portions of the patient's history were reviewed and updated as appropriate: allergies, current medications, past family history, past medical history, past social history, past surgical history and problem list.  Allergies   Allergen Reactions   • Fosamax [Alendronate Sodium] Nausea Only     Current Outpatient Medications on File Prior to Visit   Medication Sig Dispense Refill   • diazePAM (VALIUM) 5 MG tablet Take 1 tablet by mouth At Night As Needed for Anxiety. 30 tablet 2   • levothyroxine (SYNTHROID, LEVOTHROID) 75 MCG tablet TAKE 1 TABLET DAILY 90 tablet 3   • lisinopril (PRINIVIL,ZESTRIL) 10 MG tablet TAKE 1 TABLET DAILY (PLEASE SCHEDULE AN APPOINTMENT) 90 tablet 3   • omeprazole (priLOSEC) 20 MG capsule TAKE ONE CAPSULE BY MOUTH DAILY 30 capsule 11     No current facility-administered medications on file prior to visit.     No orders of the defined types were placed in this encounter.      Past Medical History:   Diagnosis Date   • Benign essential hypertension    • H/O bone density study 07/2015    slight worse osteopenia in hip, osteoporosis in wrist, forteo in 06-08   • H/O bone density study 05/11/2017   • H/O colonoscopy 02/2015    Dr. Mccartney - [facility]   • H/O mammogram 09/24/2018    [facility]   • History of echocardiogram 01/2015    [facility] - nml - no MVP   • Hypothyroidism    • Migraine    • Pap smear for cervical cancer screening 11/2012   • Postmenopausal     HRT x 2 years       Past Surgical History:   Procedure Laterality Date   • CATARACT EXTRACTION Left 04/2018    Dr. Jamil   • RETINAL DETACHMENT SURGERY Left 06/01/2013    Dr. Us; Dr. Garcia in her opthamologist   • ROOT CANAL  2018    [facility] also   • TONSILLECTOMY AND ADENOIDECTOMY  1946   • TOTAL HIP ARTHROPLASTY Right 12/2010       Social History     Socioeconomic History   • Marital status:      Spouse name:  "Not on file   • Number of children: Not on file   • Years of education: Not on file   • Highest education level: Not on file   Tobacco Use   • Smoking status: Never Smoker   • Smokeless tobacco: Never Used   Substance and Sexual Activity   • Alcohol use: Yes     Comment:  2/year   • Drug use: No     Social History     Tobacco Use   Smoking Status Never Smoker   Smokeless Tobacco Never Used        Objective   Vital Signs:   Vitals:    07/07/21 0821   BP: 122/84   Pulse: 65   Resp: 20   Temp: 97.3 °F (36.3 °C)   TempSrc: Temporal   SpO2: 98%   Weight: 54.1 kg (119 lb 3.2 oz)   Height: 162.6 cm (64\")   PainSc:   7   PainLoc: Knee      Physical Exam  Vitals reviewed.   Constitutional:       General: She is not in acute distress.     Appearance: Normal appearance. She is not ill-appearing.   HENT:      Head: Normocephalic and atraumatic.      Right Ear: Tympanic membrane, ear canal and external ear normal.      Left Ear: Tympanic membrane, ear canal and external ear normal.      Mouth/Throat:      Mouth: Mucous membranes are moist.      Pharynx: No oropharyngeal exudate or posterior oropharyngeal erythema.   Eyes:      General: No scleral icterus.     Extraocular Movements: Extraocular movements intact.      Conjunctiva/sclera: Conjunctivae normal.      Pupils: Pupils are equal, round, and reactive to light.   Cardiovascular:      Rate and Rhythm: Normal rate and regular rhythm.      Pulses: Normal pulses.      Heart sounds: Normal heart sounds. No murmur heard.     Pulmonary:      Effort: Pulmonary effort is normal. No respiratory distress.      Breath sounds: Normal breath sounds. No stridor. No wheezing, rhonchi or rales.   Abdominal:      General: Bowel sounds are normal. There is no distension.      Palpations: Abdomen is soft. There is no mass.      Tenderness: There is no abdominal tenderness. There is no guarding.   Musculoskeletal:         General: No tenderness or signs of injury. Normal range of motion.      " Cervical back: Normal range of motion and neck supple.      Right lower leg: No edema.      Left lower leg: No edema.   Lymphadenopathy:      Cervical: No cervical adenopathy.   Skin:     General: Skin is warm and dry.      Coloration: Skin is not jaundiced.      Findings: No rash.   Neurological:      General: No focal deficit present.      Mental Status: She is alert and oriented to person, place, and time.      Gait: Gait normal.   Psychiatric:         Mood and Affect: Mood normal.         Behavior: Behavior normal.        Body mass index is 20.46 kg/m².    Lab Review   CMP  Lab Results   Component Value Date    GLUCOSE 78 05/17/2021    BUN 21 05/17/2021    CREATININE 0.76 05/17/2021    EGFRIFNONA 73 05/17/2021    BCR 27.6 (H) 05/17/2021    K 4.3 05/17/2021    CO2 26.9 05/17/2021    CALCIUM 9.6 05/17/2021    ALBUMIN 4.20 05/17/2021    AST 21 05/17/2021    ALT 9 05/17/2021        CBC w/DIFF   Lab Results   Component Value Date    WBC 4.05 05/17/2021    RBC 4.77 05/17/2021    HGB 15.5 05/17/2021    HCT 45.5 05/17/2021    MCV 95.4 05/17/2021    MCH 32.5 05/17/2021    MCHC 34.1 05/17/2021    RDW 12.1 (L) 05/17/2021    RDWSD 42.2 05/17/2021    MPV 10.4 05/17/2021     05/17/2021    NEUTRORELPCT 47.4 06/05/2020    LYMPHORELPCT 37.8 06/05/2020    MONORELPCT 10.1 06/05/2020    EOSRELPCT 3.6 06/05/2020    BASORELPCT 0.8 06/05/2020    AUTOIGPER 0.3 06/05/2020    NEUTROABS 1.83 06/05/2020    LYMPHSABS 1.46 06/05/2020    MONOSABS 0.39 06/05/2020    EOSABS 0.14 06/05/2020    BASOSABS 0.03 06/05/2020    AUTOIGNUM 0.01 06/05/2020    NRBC 0.0 06/05/2020     Lab Results   Component Value Date    TSH 1.440 05/17/2021        Diagnoses and all orders for this visit:    1. Senile cataract of right eye, unspecified age-related cataract type (Primary)  Assessment & Plan:  Cleared for surgery      2. Chronic pain of right knee  Assessment & Plan:  Will order xray, refer to PT, consider referral to ortho if concerning xray or if sx  are not improved with PT. Suggested rest, ice, elevation for pain.     Orders:  -     Ambulatory Referral to Physical Therapy Evaluate and treat  -     XR Knee 1 or 2 View Right; Future    3. Acquired hypothyroidism  Assessment & Plan:  Chronic, stable, cont synthroid 75      4. Benign essential hypertension  Assessment & Plan:  Hypertension is unchanged.  Continue current treatment regimen.  Blood pressure will be reassessed at the next regular appointment. Cont lisinopril 10        81 y.o. female with planned surgery as above.    Known risk factors for perioperative complications: no major risk factors.  We discussed the patient's very low risk for major cardiovascular complications (MI, ventricular arrhythmia, cardiac arrest, pulmonary edema, complete heart block)  pertaining to surgery per the Revised Cardiac Risk Index ( 0.4%-Uche Criteria) and that complications that can arise even in healthy individuals. There are no additional steps to take in order to reduce current surgical risk. The patient verbalizes understanding and agrees to proceed as planned.   Cleared for surgery     Return for Next scheduled follow up.    Verito Sousa PA-C    * Please note that portions of this note may have been completed with a voice recognition program. Efforts were made to edit the dictation but occasionally words are erroneously transcribed.

## 2021-07-09 PROBLEM — M25.561 CHRONIC PAIN OF RIGHT KNEE: Status: ACTIVE | Noted: 2021-07-09

## 2021-07-09 PROBLEM — G89.29 CHRONIC PAIN OF RIGHT KNEE: Status: ACTIVE | Noted: 2021-07-09

## 2021-07-09 PROBLEM — H25.9 AGE-RELATED CATARACT OF RIGHT EYE: Status: ACTIVE | Noted: 2021-07-09

## 2021-07-09 NOTE — ASSESSMENT & PLAN NOTE
Hypertension is unchanged.  Continue current treatment regimen.  Blood pressure will be reassessed at the next regular appointment. Cont lisinopril 10

## 2021-07-09 NOTE — ASSESSMENT & PLAN NOTE
Will order xray, refer to PT, consider referral to ortho if concerning xray or if sx are not improved with PT. Suggested rest, ice, elevation for pain.

## 2021-07-14 ENCOUNTER — TELEPHONE (OUTPATIENT)
Dept: INTERNAL MEDICINE | Facility: CLINIC | Age: 81
End: 2021-07-14

## 2021-07-14 NOTE — TELEPHONE ENCOUNTER
Pn lft vm per JOSELITO     Verito Sousa, KATERYNA  [facility] [address] [facility]  Mild degenerative changes noted on xray. If you are not having improvement in your symptoms with physical therapy we will refer you to orthopedics, please let us know if/when you would like the referral.

## 2021-11-14 NOTE — PROGRESS NOTES
History of Present Illness     Here for medicare wellness exam and physical. No hospitalizations in last year and pt feels health is stable    living will: pt has  Pain- 0  fall risk: none, balance good  memory: no concerns or troubles  Depression - phq-2- 0  specialists:  GI - Dr Bib corrales - Dr Perez  diet -healthy - plenty of fruits/veggies, but does like sugar (cookies).  MVI, caltrate, joint select (glucosamine/hyaluronic)  exericse - does jakub chi, walks, weights  Hypertension-she is on lisinopril. She does check about every 2 weeks and gets readings similar to what we got today.     Hypothyroid-she is on Synthroid and feels well.  Weight is down a few more pounds.  BM regular.Energy level is good. She has lost 17 lbs in last 5 yrs. B< normal, temp normal, energy a little low -      Anxiety-does use Valium occasionally- maybe 2-3x/week, usually 1/2 tablet at a time and does help with anxiety and sleep    Osteoporosis-she did get Reclast in '19,  but had achiness for about 6 months so did not do in '20.  She does take calcium/D and her vitamin D level looked good on previous checks.  She does walk regularly on treadmill and does bands for resistance work      Current Outpatient Medications on File Prior to Visit   Medication Sig Dispense Refill   • diazePAM (VALIUM) 5 MG tablet Take 1 tablet by mouth At Night As Needed for Anxiety. 30 tablet 2   • levothyroxine (SYNTHROID, LEVOTHROID) 75 MCG tablet TAKE 1 TABLET DAILY 90 tablet 3   • lisinopril (PRINIVIL,ZESTRIL) 10 MG tablet TAKE 1 TABLET DAILY (PLEASE SCHEDULE AN APPOINTMENT) 90 tablet 3   • omeprazole (priLOSEC) 20 MG capsule TAKE ONE CAPSULE BY MOUTH DAILY (Patient taking differently: Daily As Needed (heartburn).) 30 capsule 11     No current facility-administered medications on file prior to visit.       The following portions of the patient's history were reviewed and updated as appropriate: allergies, current medications, past family history, past  medical history, past social history, past surgical history and problem list.    Review of Systems   Constitutional: Positive for unexpected weight loss. Negative for activity change, appetite change, fever and unexpected weight gain.   HENT: Negative.    Eyes: Negative.    Respiratory: Negative for shortness of breath and wheezing.    Cardiovascular: Negative for chest pain, palpitations and leg swelling.   Gastrointestinal: Positive for abdominal pain (occasionally will take Prilosec for a week and better). Negative for GERD.   Endocrine: Negative.    Genitourinary: Negative for difficulty urinating and dysuria.   Skin: Negative.    Allergic/Immunologic: Negative for immunocompromised state.   Neurological: Negative for seizures, speech difficulty, memory problem and confusion.   Hematological: Does not bruise/bleed easily.   Psychiatric/Behavioral: Negative for agitation.         Objective    Vitals:    11/17/21 1611   BP: 128/74   Pulse: 73   Temp: 96.9 °F (36.1 °C)   SpO2: 98%     Physical Exam   Physical Exam  Vitals and nursing note reviewed.   Constitutional:       General: She is not in acute distress.     Appearance: She is well-developed. She is not diaphoretic.   HENT:      Head: Normocephalic and atraumatic.      Right Ear: External ear normal.      Left Ear: External ear normal.      Nose: Nose normal.      Mouth/Throat:      Pharynx: No oropharyngeal exudate.   Eyes:      General: No scleral icterus.        Right eye: No discharge.         Left eye: No discharge.      Conjunctiva/sclera: Conjunctivae normal.      Pupils: Pupils are equal, round, and reactive to light.   Neck:      Thyroid: No thyromegaly.   Cardiovascular:      Rate and Rhythm: Normal rate and regular rhythm.      Heart sounds: Normal heart sounds. No murmur heard.  No friction rub. No gallop.    Pulmonary:      Effort: Pulmonary effort is normal. No respiratory distress.      Breath sounds: Normal breath sounds. No wheezing or rales.    Chest:   Breasts:      Right: No mass, nipple discharge, skin change or tenderness.      Left: No mass, nipple discharge, skin change or tenderness.       Abdominal:      General: Abdomen is flat. Bowel sounds are normal. There is no distension.      Palpations: Abdomen is soft. There is no mass.      Tenderness: There is no abdominal tenderness. There is no guarding or rebound.   Musculoskeletal:         General: No deformity. Normal range of motion.      Cervical back: Normal range of motion and neck supple.   Lymphadenopathy:      Cervical: No cervical adenopathy.   Skin:     General: Skin is warm and dry.      Coloration: Skin is not pale.      Findings: No erythema or rash.   Neurological:      Mental Status: She is alert and oriented to person, place, and time.      Coordination: Coordination normal.      Deep Tendon Reflexes: Reflexes normal.   Psychiatric:         Behavior: Behavior normal.         Thought Content: Thought content normal.         Judgment: Judgment normal.            Assessment/Plan   Diagnoses and all orders for this visit:    1. Medicare annual wellness visit, subsequent (Primary)  Regular exercise/healthy diet. BSE q month. Sunscreen use encouraged. caclium intake reviewed.  Living will -pt has and will bring  Addie -she has not had- she will  Colon done in '15, due in '25  Pneumovax - had in '20  prevnar- had  covid - had booster  Flu shot - had  DT due '23  DEXA due now  Shingles - she had the 1st shingrix  Does not need paps since age 65 or older and has had normal paps in past    2. Routine general medical examination at a health care facility  -     POC Urinalysis Dipstick, Automated    3. Benign essential hypertension-good control    4. Age-related osteoporosis without current pathological fracture-check DEXA    5. Acquired hypothyroidism-check labs.  She has had some weight loss    6. Microscopic hematuria-check urine microscopy and culture  -     Urinalysis With Microscopic -  Urine, Clean Catch; Future  -     Urine Culture - , Urine, Clean Catch; Future    7.  Weight loss-discussed further work-up we will start with the blood work and see if it progresses    8.  Anxiety-patient uses Valium as needed but does not need refill today.  She signed controlled substance contract and Michael is appropriate.

## 2021-11-17 ENCOUNTER — OFFICE VISIT (OUTPATIENT)
Dept: INTERNAL MEDICINE | Facility: CLINIC | Age: 81
End: 2021-11-17

## 2021-11-17 VITALS
HEART RATE: 73 BPM | DIASTOLIC BLOOD PRESSURE: 74 MMHG | SYSTOLIC BLOOD PRESSURE: 128 MMHG | HEIGHT: 64 IN | BODY MASS INDEX: 20.08 KG/M2 | OXYGEN SATURATION: 98 % | TEMPERATURE: 96.9 F | WEIGHT: 117.6 LBS

## 2021-11-17 DIAGNOSIS — R63.4 ABNORMAL WEIGHT LOSS: ICD-10-CM

## 2021-11-17 DIAGNOSIS — M81.0 AGE-RELATED OSTEOPOROSIS WITHOUT CURRENT PATHOLOGICAL FRACTURE: ICD-10-CM

## 2021-11-17 DIAGNOSIS — Z00.00 MEDICARE ANNUAL WELLNESS VISIT, SUBSEQUENT: Primary | ICD-10-CM

## 2021-11-17 DIAGNOSIS — E03.9 ACQUIRED HYPOTHYROIDISM: ICD-10-CM

## 2021-11-17 DIAGNOSIS — I10 BENIGN ESSENTIAL HYPERTENSION: ICD-10-CM

## 2021-11-17 DIAGNOSIS — Z00.00 ROUTINE GENERAL MEDICAL EXAMINATION AT A HEALTH CARE FACILITY: ICD-10-CM

## 2021-11-17 DIAGNOSIS — R31.29 MICROSCOPIC HEMATURIA: ICD-10-CM

## 2021-11-17 DIAGNOSIS — Z78.0 POSTMENOPAUSAL: ICD-10-CM

## 2021-11-17 DIAGNOSIS — F41.9 ANXIETY: ICD-10-CM

## 2021-11-17 LAB
BILIRUB BLD-MCNC: NEGATIVE MG/DL
CLARITY, POC: CLEAR
COLOR UR: YELLOW
EXPIRATION DATE: ABNORMAL
GLUCOSE UR STRIP-MCNC: NEGATIVE MG/DL
KETONES UR QL: NEGATIVE
LEUKOCYTE EST, POC: NEGATIVE
Lab: ABNORMAL
NITRITE UR-MCNC: NEGATIVE MG/ML
PH UR: 5 [PH] (ref 5–8)
PROT UR STRIP-MCNC: NEGATIVE MG/DL
RBC # UR STRIP: ABNORMAL /UL
SP GR UR: 1.03 (ref 1–1.03)
UROBILINOGEN UR QL: NORMAL

## 2021-11-17 PROCEDURE — 99397 PER PM REEVAL EST PAT 65+ YR: CPT | Performed by: INTERNAL MEDICINE

## 2021-11-17 PROCEDURE — 81003 URINALYSIS AUTO W/O SCOPE: CPT | Performed by: INTERNAL MEDICINE

## 2021-11-17 PROCEDURE — G0439 PPPS, SUBSEQ VISIT: HCPCS | Performed by: INTERNAL MEDICINE

## 2021-11-17 PROCEDURE — 1170F FXNL STATUS ASSESSED: CPT | Performed by: INTERNAL MEDICINE

## 2021-11-17 PROCEDURE — 1160F RVW MEDS BY RX/DR IN RCRD: CPT | Performed by: INTERNAL MEDICINE

## 2021-11-17 PROCEDURE — 1126F AMNT PAIN NOTED NONE PRSNT: CPT | Performed by: INTERNAL MEDICINE

## 2021-11-18 ENCOUNTER — LAB (OUTPATIENT)
Dept: LAB | Facility: HOSPITAL | Age: 81
End: 2021-11-18

## 2021-11-18 DIAGNOSIS — I10 BENIGN ESSENTIAL HYPERTENSION: ICD-10-CM

## 2021-11-18 DIAGNOSIS — Z00.00 ROUTINE GENERAL MEDICAL EXAMINATION AT A HEALTH CARE FACILITY: ICD-10-CM

## 2021-11-18 DIAGNOSIS — E03.9 ACQUIRED HYPOTHYROIDISM: ICD-10-CM

## 2021-11-18 DIAGNOSIS — R31.29 MICROSCOPIC HEMATURIA: ICD-10-CM

## 2021-11-18 LAB
ALBUMIN SERPL-MCNC: 4.2 G/DL (ref 3.5–5.2)
ALBUMIN/GLOB SERPL: 1.6 G/DL
ALP SERPL-CCNC: 59 U/L (ref 39–117)
ALT SERPL W P-5'-P-CCNC: 9 U/L (ref 1–33)
ANION GAP SERPL CALCULATED.3IONS-SCNC: 9 MMOL/L (ref 5–15)
AST SERPL-CCNC: 21 U/L (ref 1–32)
BACTERIA UR QL AUTO: ABNORMAL /HPF
BILIRUB SERPL-MCNC: 0.6 MG/DL (ref 0–1.2)
BILIRUB UR QL STRIP: NEGATIVE
BUN SERPL-MCNC: 21 MG/DL (ref 8–23)
BUN/CREAT SERPL: 21.9 (ref 7–25)
CALCIUM SPEC-SCNC: 9.3 MG/DL (ref 8.6–10.5)
CHLORIDE SERPL-SCNC: 104 MMOL/L (ref 98–107)
CHOLEST SERPL-MCNC: 233 MG/DL (ref 0–200)
CLARITY UR: CLEAR
CO2 SERPL-SCNC: 27 MMOL/L (ref 22–29)
COLOR UR: YELLOW
CREAT SERPL-MCNC: 0.96 MG/DL (ref 0.57–1)
DEPRECATED RDW RBC AUTO: 43.7 FL (ref 37–54)
ERYTHROCYTE [DISTWIDTH] IN BLOOD BY AUTOMATED COUNT: 12.6 % (ref 12.3–15.4)
GFR SERPL CREATININE-BSD FRML MDRD: 56 ML/MIN/1.73
GLOBULIN UR ELPH-MCNC: 2.6 GM/DL
GLUCOSE SERPL-MCNC: 86 MG/DL (ref 65–99)
GLUCOSE UR STRIP-MCNC: NEGATIVE MG/DL
HCT VFR BLD AUTO: 43.9 % (ref 34–46.6)
HDLC SERPL-MCNC: 104 MG/DL (ref 40–60)
HGB BLD-MCNC: 14.9 G/DL (ref 12–15.9)
HGB UR QL STRIP.AUTO: ABNORMAL
HYALINE CASTS UR QL AUTO: ABNORMAL /LPF
KETONES UR QL STRIP: NEGATIVE
LDLC SERPL CALC-MCNC: 118 MG/DL (ref 0–100)
LDLC/HDLC SERPL: 1.11 {RATIO}
LEUKOCYTE ESTERASE UR QL STRIP.AUTO: ABNORMAL
MCH RBC QN AUTO: 32.1 PG (ref 26.6–33)
MCHC RBC AUTO-ENTMCNC: 33.9 G/DL (ref 31.5–35.7)
MCV RBC AUTO: 94.6 FL (ref 79–97)
NITRITE UR QL STRIP: NEGATIVE
PH UR STRIP.AUTO: 5.5 [PH] (ref 5–8)
PLATELET # BLD AUTO: 170 10*3/MM3 (ref 140–450)
PMV BLD AUTO: 10.9 FL (ref 6–12)
POTASSIUM SERPL-SCNC: 4.2 MMOL/L (ref 3.5–5.2)
PROT SERPL-MCNC: 6.8 G/DL (ref 6–8.5)
PROT UR QL STRIP: NEGATIVE
RBC # BLD AUTO: 4.64 10*6/MM3 (ref 3.77–5.28)
RBC # UR STRIP: ABNORMAL /HPF
REF LAB TEST METHOD: ABNORMAL
SODIUM SERPL-SCNC: 140 MMOL/L (ref 136–145)
SP GR UR STRIP: 1.02 (ref 1–1.03)
SQUAMOUS #/AREA URNS HPF: ABNORMAL /HPF
TRIGL SERPL-MCNC: 66 MG/DL (ref 0–150)
TSH SERPL DL<=0.05 MIU/L-ACNC: 2.29 UIU/ML (ref 0.27–4.2)
UROBILINOGEN UR QL STRIP: ABNORMAL
VLDLC SERPL-MCNC: 11 MG/DL (ref 5–40)
WBC # UR STRIP: ABNORMAL /HPF
WBC NRBC COR # BLD: 3.67 10*3/MM3 (ref 3.4–10.8)

## 2021-11-18 PROCEDURE — 84443 ASSAY THYROID STIM HORMONE: CPT | Performed by: INTERNAL MEDICINE

## 2021-11-18 PROCEDURE — 85027 COMPLETE CBC AUTOMATED: CPT | Performed by: INTERNAL MEDICINE

## 2021-11-18 PROCEDURE — 81001 URINALYSIS AUTO W/SCOPE: CPT | Performed by: INTERNAL MEDICINE

## 2021-11-18 PROCEDURE — 87086 URINE CULTURE/COLONY COUNT: CPT | Performed by: INTERNAL MEDICINE

## 2021-11-18 PROCEDURE — 80053 COMPREHEN METABOLIC PANEL: CPT | Performed by: INTERNAL MEDICINE

## 2021-11-18 PROCEDURE — 80061 LIPID PANEL: CPT | Performed by: INTERNAL MEDICINE

## 2021-11-19 LAB — BACTERIA SPEC AEROBE CULT: NO GROWTH

## 2022-04-04 ENCOUNTER — HOSPITAL ENCOUNTER (OUTPATIENT)
Dept: BONE DENSITY | Facility: HOSPITAL | Age: 82
Discharge: HOME | End: 2022-04-04
Admitting: INTERNAL MEDICINE

## 2022-04-04 DIAGNOSIS — Z78.0 POSTMENOPAUSAL: ICD-10-CM

## 2022-04-04 PROCEDURE — 77080 DXA BONE DENSITY AXIAL: CPT

## 2022-04-20 RX ORDER — RALOXIFENE HYDROCHLORIDE 60 MG/1
60 TABLET, FILM COATED ORAL DAILY
Qty: 90 TABLET | Refills: 3 | Status: SHIPPED | OUTPATIENT
Start: 2022-04-20 | End: 2022-05-25

## 2022-04-21 ENCOUNTER — TELEPHONE (OUTPATIENT)
Dept: INTERNAL MEDICINE | Facility: CLINIC | Age: 82
End: 2022-04-21

## 2022-04-21 NOTE — TELEPHONE ENCOUNTER
Informed the patient that she would need to be seen. Scheduled her for an appointment tomorrow at [facility] with Christine Snow.

## 2022-04-21 NOTE — TELEPHONE ENCOUNTER
Caller: Nicole Ruiz    Relationship: Self    Best call back number: [phone] (H)    What medication are you requesting: ANTIBIOTICS    What are your current symptoms: FREQUENT, PAINFUL URINATION, STRONG ODOR    How long have you been experiencing symptoms: ONE WEEK     Have you had these symptoms before:    [x] Yes  [] No    Have you been treated for these symptoms before:   [x] Yes  [] No    If a prescription is needed, what is your preferred pharmacy and [place] [address] [facility] [phone] [facility] [phone]      Additional notes:

## 2022-05-13 DIAGNOSIS — E03.9 ACQUIRED HYPOTHYROIDISM: ICD-10-CM

## 2022-05-13 RX ORDER — LEVOTHYROXINE SODIUM 0.07 MG/1
TABLET ORAL
Qty: 90 TABLET | Refills: 0 | Status: SHIPPED | OUTPATIENT
Start: 2022-05-13 | End: 2022-08-11

## 2022-05-13 RX ORDER — LISINOPRIL 10 MG/1
10 TABLET ORAL DAILY
Qty: 90 TABLET | Refills: 0 | Status: SHIPPED | OUTPATIENT
Start: 2022-05-13 | End: 2022-08-11

## 2022-05-25 ENCOUNTER — OFFICE VISIT (OUTPATIENT)
Dept: INTERNAL MEDICINE | Facility: CLINIC | Age: 82
End: 2022-05-25

## 2022-05-25 ENCOUNTER — HOSPITAL ENCOUNTER (OUTPATIENT)
Dept: GENERAL RADIOLOGY | Facility: HOSPITAL | Age: 82
Discharge: HOME | End: 2022-05-25

## 2022-05-25 ENCOUNTER — LAB (OUTPATIENT)
Dept: LAB | Facility: HOSPITAL | Age: 82
End: 2022-05-25

## 2022-05-25 VITALS
HEART RATE: 78 BPM | DIASTOLIC BLOOD PRESSURE: 78 MMHG | SYSTOLIC BLOOD PRESSURE: 120 MMHG | RESPIRATION RATE: 12 BRPM | WEIGHT: 117.2 LBS | TEMPERATURE: 98.5 F | OXYGEN SATURATION: 99 % | BODY MASS INDEX: 20.01 KG/M2 | HEIGHT: 64 IN

## 2022-05-25 DIAGNOSIS — M25.512 CHRONIC LEFT SHOULDER PAIN: ICD-10-CM

## 2022-05-25 DIAGNOSIS — M81.0 AGE-RELATED OSTEOPOROSIS WITHOUT CURRENT PATHOLOGICAL FRACTURE: Chronic | ICD-10-CM

## 2022-05-25 DIAGNOSIS — E03.9 ACQUIRED HYPOTHYROIDISM: Chronic | ICD-10-CM

## 2022-05-25 DIAGNOSIS — I10 BENIGN ESSENTIAL HYPERTENSION: ICD-10-CM

## 2022-05-25 DIAGNOSIS — G89.29 CHRONIC LEFT SHOULDER PAIN: ICD-10-CM

## 2022-05-25 DIAGNOSIS — F41.9 ANXIETY: Chronic | ICD-10-CM

## 2022-05-25 DIAGNOSIS — F51.01 PRIMARY INSOMNIA: Chronic | ICD-10-CM

## 2022-05-25 DIAGNOSIS — I10 BENIGN ESSENTIAL HYPERTENSION: Primary | Chronic | ICD-10-CM

## 2022-05-25 PROBLEM — M85.80 OSTEOPENIA: Status: RESOLVED | Noted: 2019-09-01 | Resolved: 2022-05-25

## 2022-05-25 LAB
BASOPHILS # BLD AUTO: 0.03 10*3/MM3 (ref 0–0.2)
BASOPHILS NFR BLD AUTO: 0.6 % (ref 0–1.5)
DEPRECATED RDW RBC AUTO: 46 FL (ref 37–54)
EOSINOPHIL # BLD AUTO: 0.14 10*3/MM3 (ref 0–0.4)
EOSINOPHIL NFR BLD AUTO: 3 % (ref 0.3–6.2)
ERYTHROCYTE [DISTWIDTH] IN BLOOD BY AUTOMATED COUNT: 12.8 % (ref 12.3–15.4)
HCT VFR BLD AUTO: 44.2 % (ref 34–46.6)
HGB BLD-MCNC: 14.6 G/DL (ref 12–15.9)
IMM GRANULOCYTES # BLD AUTO: 0.01 10*3/MM3 (ref 0–0.05)
IMM GRANULOCYTES NFR BLD AUTO: 0.2 % (ref 0–0.5)
LYMPHOCYTES # BLD AUTO: 1.37 10*3/MM3 (ref 0.7–3.1)
LYMPHOCYTES NFR BLD AUTO: 29 % (ref 19.6–45.3)
MCH RBC QN AUTO: 31.8 PG (ref 26.6–33)
MCHC RBC AUTO-ENTMCNC: 33 G/DL (ref 31.5–35.7)
MCV RBC AUTO: 96.3 FL (ref 79–97)
MONOCYTES # BLD AUTO: 0.45 10*3/MM3 (ref 0.1–0.9)
MONOCYTES NFR BLD AUTO: 9.5 % (ref 5–12)
NEUTROPHILS NFR BLD AUTO: 2.72 10*3/MM3 (ref 1.7–7)
NEUTROPHILS NFR BLD AUTO: 57.7 % (ref 42.7–76)
NRBC BLD AUTO-RTO: 0 /100 WBC (ref 0–0.2)
PLATELET # BLD AUTO: 191 10*3/MM3 (ref 140–450)
PMV BLD AUTO: 10.6 FL (ref 6–12)
RBC # BLD AUTO: 4.59 10*6/MM3 (ref 3.77–5.28)
WBC NRBC COR # BLD: 4.72 10*3/MM3 (ref 3.4–10.8)

## 2022-05-25 PROCEDURE — 80053 COMPREHEN METABOLIC PANEL: CPT | Performed by: INTERNAL MEDICINE

## 2022-05-25 PROCEDURE — 85025 COMPLETE CBC W/AUTO DIFF WBC: CPT | Performed by: INTERNAL MEDICINE

## 2022-05-25 PROCEDURE — 84443 ASSAY THYROID STIM HORMONE: CPT | Performed by: INTERNAL MEDICINE

## 2022-05-25 PROCEDURE — 73030 X-RAY EXAM OF SHOULDER: CPT

## 2022-05-25 PROCEDURE — 84439 ASSAY OF FREE THYROXINE: CPT | Performed by: INTERNAL MEDICINE

## 2022-05-25 PROCEDURE — 99214 OFFICE O/P EST MOD 30 MIN: CPT | Performed by: INTERNAL MEDICINE

## 2022-05-25 RX ORDER — DIAZEPAM 5 MG/1
5 TABLET ORAL NIGHTLY PRN
Qty: 30 TABLET | Refills: 2 | Status: SHIPPED | OUTPATIENT
Start: 2022-05-25

## 2022-05-25 NOTE — PROGRESS NOTES
"Chief Complaint  Follow-up (6 month, Raloxiphine is cause cramping in both legs.) and Shoulder Pain (Left)    Subjective          Nicole Ruiz presents to [facility]  History of Present Illness    Hypertension-she is on lisinopril. She does check about every 2 weeks and gets readings similar to what we got today.     Hypothyroid-she is on Synthroid 75 mcg.  Last TSH was 6 months ago and was 2.2   She has lost 17 lbs in last 5 yrs but weight has been stable over the last year.  Energy level is good.     Anxiety-does use Valium occasionally- maybe 2-3x/week, usually 1/2 tablet at a time and does help with anxiety and sleep.  Last filled 6 months ago.  Does need a refill today     Osteoporosis-she did get Reclast in '19,  but had achiness for about 6 months so did not do in '20.  She does take calcium/D and her vitamin D level looked good on previous checks.  She does walk regularly on treadmill and does bands for resistance work.  She has been on evista for 3 weeks, but has started giving her leg cramps. . She did stop it 2 days ago and does feel some better already    Shoulder pain L - x 6 months. Hard to lift arm. No pain at night, more when she lifts. She will take tylenol as needed.  No injury but does play golf but has not been able to since shoulder started bothering her.  She is right-handed        Current Outpatient Medications:   •  diazePAM (VALIUM) 5 MG tablet, Take 1 tablet by mouth At Night As Needed for Anxiety., Disp: 30 tablet, Rfl: 2  •  levothyroxine (SYNTHROID, LEVOTHROID) 75 MCG tablet, TAKE 1 TABLET DAILY, Disp: 90 tablet, Rfl: 0  •  lisinopril (PRINIVIL,ZESTRIL) 10 MG tablet, Take 1 tablet by mouth Daily., Disp: 90 tablet, Rfl: 0      Objective   Vital Signs:   /78   Pulse 78   Temp 98.5 °F (36.9 °C)   Resp 12   Ht 162.1 cm (63.8\")   Wt 53.2 kg (117 lb 3.2 oz)   SpO2 99%   BMI 20.24 kg/m²       Physical exam  Constitutional: oriented to person, place, " and time.  well-developed and well-nourished. No distress.   HENT:   Head: Normocephalic and atraumatic.   Eyes: Conjunctivae and EOM are normal.   Cardiovascular: Normal rate, regular rhythm and normal heart sounds.  Exam reveals no gallop and no friction rub.    No murmur heard.  Pulmonary/Chest: Effort normal and breath sounds normal. No respiratory distress.   no wheezes.   Neurological:  alert and oriented to person, place, and time.   Skin: Skin is warm and dry. not diaphoretic.   MS: left shoulder - no tenderness; + crepitus, decreased active rotation  Psychiatric:  normal mood and affect. behavior is normal. Judgment and thought content normal.      Result Review :{Labs  Result Review  Imaging  Med Tab  Media :23}   The following data was reviewed by: Cherelle Tee MD on 05/25/2022:  CMP    CMP 11/18/21   Glucose 86   BUN 21   Creatinine 0.96   eGFR Non African Am 56 (A)   Sodium 140   Potassium 4.2   Chloride 104   Calcium 9.3   Albumin 4.20   Total Bilirubin 0.6   Alkaline Phosphatase 59   AST (SGOT) 21   ALT (SGPT) 9   (A) Abnormal value            CBC    CBC 11/18/21   WBC 3.67   RBC 4.64   Hemoglobin 14.9   Hematocrit 43.9   MCV 94.6   MCH 32.1   MCHC 33.9   RDW 12.6   Platelets 170           Lipid Panel    Lipid Panel 11/18/21   Total Cholesterol 233 (A)   Triglycerides 66   HDL Cholesterol 104 (A)   VLDL Cholesterol 11   LDL Cholesterol  118 (A)   LDL/HDL Ratio 1.11   (A) Abnormal value            TSH    TSH 11/18/21   TSH 2.290           Lab Results   Component Value Date    VOGG35NZ 50.5 06/05/2020               Assessment and Plan    Diagnoses and all orders for this visit:    1. Benign essential hypertension (Primary)-good control  -     CBC & Differential; Future  -     Comprehensive Metabolic Panel; Future    2. Acquired hypothyroidism-check levels today  -     TSH; Future  -     T4, Free; Future    3. Age-related osteoporosis without current pathological fracture-patient will continue  exercising, calcium/D and we will recheck in 2 years    4. Chronic left shoulder pain- probably some component of arthritis.  We will check x-ray and then either refer to PT or Ortho  -     XR Shoulder 2+ View Left; Future    5. Anxiety/ Primary insomnia  Comments:  Stable on as needed Valium.  She has signed JIM, Michael appropriate.  Refill today  Orders:  -     diazePAM (VALIUM) 5 MG tablet; Take 1 tablet by mouth At Night As Needed for Anxiety.  Dispense: 30 tablet; Refill: 2        Follow Up   Return in about 6 months (around 11/25/2022) for Medicare Wellness.  Patient was given instructions and counseling regarding her condition or for health maintenance advice. Please see specific information pulled into the AVS if appropriate.

## 2022-05-26 LAB
ALBUMIN SERPL-MCNC: 3.9 G/DL (ref 3.5–5.2)
ALBUMIN/GLOB SERPL: 1.2 G/DL
ALP SERPL-CCNC: 60 U/L (ref 39–117)
ALT SERPL W P-5'-P-CCNC: 10 U/L (ref 1–33)
ANION GAP SERPL CALCULATED.3IONS-SCNC: 8.8 MMOL/L (ref 5–15)
AST SERPL-CCNC: 19 U/L (ref 1–32)
BILIRUB SERPL-MCNC: 0.3 MG/DL (ref 0–1.2)
BUN SERPL-MCNC: 22 MG/DL (ref 8–23)
BUN/CREAT SERPL: 25.3 (ref 7–25)
CALCIUM SPEC-SCNC: 9.9 MG/DL (ref 8.6–10.5)
CHLORIDE SERPL-SCNC: 106 MMOL/L (ref 98–107)
CO2 SERPL-SCNC: 26.2 MMOL/L (ref 22–29)
CREAT SERPL-MCNC: 0.87 MG/DL (ref 0.57–1)
EGFRCR SERPLBLD CKD-EPI 2021: 67.4 ML/MIN/1.73
GLOBULIN UR ELPH-MCNC: 3.3 GM/DL
GLUCOSE SERPL-MCNC: 86 MG/DL (ref 65–99)
POTASSIUM SERPL-SCNC: 4.5 MMOL/L (ref 3.5–5.2)
PROT SERPL-MCNC: 7.2 G/DL (ref 6–8.5)
SODIUM SERPL-SCNC: 141 MMOL/L (ref 136–145)
T4 FREE SERPL-MCNC: 1.58 NG/DL (ref 0.93–1.7)
TSH SERPL DL<=0.05 MIU/L-ACNC: 1.8 UIU/ML (ref 0.27–4.2)

## 2022-06-21 ENCOUNTER — OFFICE VISIT (OUTPATIENT)
Dept: INTERNAL MEDICINE | Facility: CLINIC | Age: 82
End: 2022-06-21

## 2022-06-21 VITALS
HEART RATE: 74 BPM | SYSTOLIC BLOOD PRESSURE: 122 MMHG | BODY MASS INDEX: 20.14 KG/M2 | DIASTOLIC BLOOD PRESSURE: 88 MMHG | OXYGEN SATURATION: 97 % | TEMPERATURE: 96.8 F | HEIGHT: 64 IN | RESPIRATION RATE: 20 BRPM | WEIGHT: 118 LBS

## 2022-06-21 DIAGNOSIS — M62.838 MUSCLE SPASM: Primary | ICD-10-CM

## 2022-06-21 DIAGNOSIS — M54.6 ACUTE RIGHT-SIDED THORACIC BACK PAIN: ICD-10-CM

## 2022-06-21 PROCEDURE — 99213 OFFICE O/P EST LOW 20 MIN: CPT | Performed by: PHYSICIAN ASSISTANT

## 2022-06-21 RX ORDER — PREDNISONE 20 MG/1
20 TABLET ORAL DAILY
Qty: 5 TABLET | Refills: 0 | OUTPATIENT
Start: 2022-06-21 | End: 2022-11-19

## 2022-06-21 RX ORDER — CYCLOBENZAPRINE HCL 5 MG
5 TABLET ORAL 3 TIMES DAILY PRN
COMMUNITY
End: 2023-01-11

## 2022-06-21 RX ORDER — MELOXICAM 15 MG/1
TABLET ORAL
COMMUNITY
Start: 2022-06-18 | End: 2022-06-21

## 2022-06-21 NOTE — PROGRESS NOTES
Chief Complaint  Back Spasm    Subjective          History of Present Illness  Nicole Ruiz presents to [facility] for   Muscle Spasm:  Noticed pain in her back near her shoulder blade that started on Monday and got worse over the next few days. She has been using a heating pad and muscle rub. Only time it is comfortable is when she is laying flat in bed with a heating pad. Has been using a muscle rub and pain patches and they are not really helping. Went to the ER in [place] on Friday d/t persistent pain. They did an xray and it was normal. Does not recall injury. No hx of back problems. No pain w/neck movements. No decreased arm movement.  No SOA or CP, no dizziness, no abd pain. Just difficult to get it to ease up.       Review of Systems   Constitutional: Negative for fever and unexpected weight loss.   Respiratory: Negative for cough, shortness of breath and wheezing.    Cardiovascular: Negative for chest pain and palpitations.   Musculoskeletal: Positive for back pain.       The following portions of the patient's history were reviewed and updated as appropriate: allergies, current medications, past family history, past medical history, past social history, past surgical history and problem list.  Allergies   Allergen Reactions   • Fosamax [Alendronate Sodium] Nausea Only     Current Outpatient Medications on File Prior to Visit   Medication Sig Dispense Refill   • cyclobenzaprine (FLEXERIL) 5 MG tablet Take 5 mg by mouth 3 (Three) Times a Day As Needed for Muscle Spasms. Pt uncertain of dosage.     • diazePAM (VALIUM) 5 MG tablet Take 1 tablet by mouth At Night As Needed for Anxiety. 30 tablet 2   • levothyroxine (SYNTHROID, LEVOTHROID) 75 MCG tablet TAKE 1 TABLET DAILY 90 tablet 0   • lisinopril (PRINIVIL,ZESTRIL) 10 MG tablet Take 1 tablet by mouth Daily. 90 tablet 0     No current facility-administered medications on file prior to visit.     New Medications Ordered This  "Visit   Medications   • diclofenac (VOLTAREN) 50 MG EC tablet     Sig: Take 1 tablet by mouth 2 (Two) Times a Day As Needed (pain).     Dispense:  40 tablet     Refill:  0   • predniSONE (DELTASONE) 20 MG tablet     Sig: Take 1 tablet by mouth Daily.     Dispense:  5 tablet     Refill:  0     Social History     Tobacco Use   Smoking Status Never Smoker   Smokeless Tobacco Never Used        Objective   Vital Signs:   Vitals:    06/21/22 1445   BP: 122/88   Pulse: 74   Resp: 20   Temp: 96.8 °F (36 °C)   TempSrc: Temporal   SpO2: 97%   Weight: 53.5 kg (118 lb)   Height: 162.1 cm (63.8\")   PainSc:   9   PainLoc: Back      Physical Exam  Vitals reviewed.   Constitutional:       General: She is not in acute distress.     Appearance: Normal appearance.   HENT:      Head: Normocephalic and atraumatic.   Eyes:      General: No scleral icterus.     Extraocular Movements: Extraocular movements intact.      Conjunctiva/sclera: Conjunctivae normal.   Cardiovascular:      Rate and Rhythm: Normal rate and regular rhythm.      Heart sounds: Normal heart sounds. No murmur heard.  Pulmonary:      Effort: Pulmonary effort is normal. No respiratory distress.      Breath sounds: Normal breath sounds. No stridor. No wheezing or rhonchi.   Musculoskeletal:         General: No tenderness. Normal range of motion.      Cervical back: Normal range of motion and neck supple.   Skin:     General: Skin is warm and dry.      Coloration: Skin is not jaundiced.   Neurological:      General: No focal deficit present.      Mental Status: She is alert and oriented to person, place, and time.      Gait: Gait normal.   Psychiatric:         Mood and Affect: Mood normal.         Behavior: Behavior normal.        No LMP recorded. Patient is postmenopausal.    Result Review :                   Assessment and Plan    Diagnoses and all orders for this visit:    1. Muscle spasm (Primary)  -     diclofenac (VOLTAREN) 50 MG EC tablet; Take 1 tablet by mouth 2 " (Two) Times a Day As Needed (pain).  Dispense: 40 tablet; Refill: 0  -     predniSONE (DELTASONE) 20 MG tablet; Take 1 tablet by mouth Daily.  Dispense: 5 tablet; Refill: 0    2. Acute right-sided thoracic back pain  Assessment & Plan:  Normal xrays per pt at ER visit, requesting ER notes. Cont flexeril and diclofenac as prescribed, will add prednisone. F/u if sx persist or worsen    Orders:  -     diclofenac (VOLTAREN) 50 MG EC tablet; Take 1 tablet by mouth 2 (Two) Times a Day As Needed (pain).  Dispense: 40 tablet; Refill: 0  -     predniSONE (DELTASONE) 20 MG tablet; Take 1 tablet by mouth Daily.  Dispense: 5 tablet; Refill: 0      Follow Up   Return if symptoms worsen or fail to improve.    Follow up if symptoms worsen or persist or has new or concerning symptoms, go to ER for severe symptoms.   Reviewed common medication effects and side effects and advised to report side effects immediately.  Encouraged medication compliance and the importance of keeping scheduled follow up appointments with me and any other providers.  If a referral was made please contact our office if you have not heard about an appointment in the next 2 weeks.   If labs or images are ordered we will contact you with the results within the next week.  If you have not heard from us after a week please call our office to inquire about the results.   Patient was given instructions and counseling regarding her condition or for health maintenance advice. Please see specific information pulled into the AVS if appropriate.     Verito Sousa PA-C    * Please note that portions of this note were completed with a voice recognition program.

## 2022-06-23 ENCOUNTER — TELEPHONE (OUTPATIENT)
Dept: INTERNAL MEDICINE | Facility: CLINIC | Age: 82
End: 2022-06-23

## 2022-06-23 PROBLEM — M54.6 ACUTE RIGHT-SIDED THORACIC BACK PAIN: Status: ACTIVE | Noted: 2022-06-23

## 2022-06-23 PROBLEM — M62.838 MUSCLE SPASM: Status: ACTIVE | Noted: 2022-06-23

## 2022-06-23 NOTE — ASSESSMENT & PLAN NOTE
Normal xrays per pt at ER visit, requesting ER notes. Cont flexeril and diclofenac as prescribed, will add prednisone. F/u if sx persist or worsen

## 2022-07-26 ENCOUNTER — OFFICE VISIT (OUTPATIENT)
Dept: INTERNAL MEDICINE | Facility: CLINIC | Age: 82
End: 2022-07-26

## 2022-07-26 VITALS
TEMPERATURE: 96.4 F | HEIGHT: 64 IN | HEART RATE: 67 BPM | OXYGEN SATURATION: 99 % | DIASTOLIC BLOOD PRESSURE: 68 MMHG | SYSTOLIC BLOOD PRESSURE: 126 MMHG | BODY MASS INDEX: 19.87 KG/M2 | WEIGHT: 116.4 LBS

## 2022-07-26 DIAGNOSIS — R82.90 CLOUDY URINE: ICD-10-CM

## 2022-07-26 DIAGNOSIS — R31.9 URINARY TRACT INFECTION WITH HEMATURIA, SITE UNSPECIFIED: Primary | ICD-10-CM

## 2022-07-26 DIAGNOSIS — R82.90 BAD ODOR OF URINE: ICD-10-CM

## 2022-07-26 DIAGNOSIS — N39.0 URINARY TRACT INFECTION WITH HEMATURIA, SITE UNSPECIFIED: Primary | ICD-10-CM

## 2022-07-26 LAB
BILIRUB BLD-MCNC: NEGATIVE MG/DL
CLARITY, POC: ABNORMAL
COLOR UR: YELLOW
EXPIRATION DATE: ABNORMAL
GLUCOSE UR STRIP-MCNC: NEGATIVE MG/DL
KETONES UR QL: NEGATIVE
LEUKOCYTE EST, POC: ABNORMAL
Lab: ABNORMAL
NITRITE UR-MCNC: NEGATIVE MG/ML
PH UR: 6 [PH] (ref 5–8)
PROT UR STRIP-MCNC: NEGATIVE MG/DL
RBC # UR STRIP: ABNORMAL /UL
SP GR UR: 1.01 (ref 1–1.03)
UROBILINOGEN UR QL: NORMAL

## 2022-07-26 PROCEDURE — 87077 CULTURE AEROBIC IDENTIFY: CPT | Performed by: PHYSICIAN ASSISTANT

## 2022-07-26 PROCEDURE — 81003 URINALYSIS AUTO W/O SCOPE: CPT | Performed by: PHYSICIAN ASSISTANT

## 2022-07-26 PROCEDURE — 99213 OFFICE O/P EST LOW 20 MIN: CPT | Performed by: PHYSICIAN ASSISTANT

## 2022-07-26 PROCEDURE — 87186 SC STD MICRODIL/AGAR DIL: CPT | Performed by: PHYSICIAN ASSISTANT

## 2022-07-26 PROCEDURE — 87086 URINE CULTURE/COLONY COUNT: CPT | Performed by: PHYSICIAN ASSISTANT

## 2022-07-26 RX ORDER — CEFDINIR 300 MG/1
300 CAPSULE ORAL 2 TIMES DAILY
Qty: 14 CAPSULE | Refills: 0 | Status: SHIPPED | OUTPATIENT
Start: 2022-07-26 | End: 2022-08-02

## 2022-07-26 NOTE — PROGRESS NOTES
"Answers for HPI/ROS submitted by the patient on 7/26/2022  What is the primary reason for your visit?: Painful Urination    Chief Complaint  Urinary Tract Infection (Cloudy and has an odor)    Subjective        Nicole Ruiz presents to [facility]  History of Present Illness  Patient states that she noted to 3 days ago she began having more cloudy urine with an odor.  She states she used drugstore screen for her urine and was found it was positive for leukocytes.  She denies any frequency urgency.  She states she has had some pain with urination once or twice.  She states that she has not any fever back or stomach pain.  She states that she is going out of town and wanted to make sure that this was taken care of before she left.  She states that she has had a UTI before which occurred several months ago.  She was seen for that at the [facility] for it.  She states at that time she took an antibiotic and cleared      Patient staes that she has not iced cloudy urine with an order. She states she used drug dstoer and po for leuk. She states this all started three days go.  She denies any fewq or urgency . She has had pain wih urination once or tivce.  She has had no fever or back or sotmach pain. Sh eis going out ot town.. Bong has had uti beofer and had one several months ago,  She went to [facility] a few omonths sgo., took anitbiotics and cleared  Objective   Vital Signs:  /68   Pulse 67   Temp 96.4 °F (35.8 °C) (Temporal)   Ht 162.1 cm (63.82\")   Wt 52.8 kg (116 lb 6.4 oz)   SpO2 99%   BMI 20.09 kg/m²   Estimated body mass index is 20.09 kg/m² as calculated from the following:    Height as of this encounter: 162.1 cm (63.82\").    Weight as of this encounter: 52.8 kg (116 lb 6.4 oz).    BMI is within normal parameters. No other follow-up for BMI required.      Physical Exam  Vitals reviewed.   Constitutional:       Appearance: Normal appearance. She is normal weight. "   HENT:      Head: Normocephalic and atraumatic.      Right Ear: Tympanic membrane, ear canal and external ear normal.      Left Ear: Tympanic membrane, ear canal and external ear normal.      Nose: Nose normal.      Mouth/Throat:      Mouth: Mucous membranes are moist.   Eyes:      Pupils: Pupils are equal, round, and reactive to light.   Cardiovascular:      Rate and Rhythm: Normal rate and regular rhythm.      Heart sounds: Normal heart sounds.   Pulmonary:      Effort: Pulmonary effort is normal.      Breath sounds: Normal breath sounds.   Neurological:      General: No focal deficit present.      Mental Status: She is alert.   Psychiatric:         Mood and Affect: Mood normal.        Result Review :                Assessment and Plan   Diagnoses and all orders for this visit:    1. Urinary tract infection with hematuria, site unspecified (Primary)  -     Urine Culture - Urine, Urine, Clean Catch; Future  -     cefdinir (OMNICEF) 300 MG capsule; Take 1 capsule by mouth 2 (Two) Times a Day for 7 days.  Dispense: 14 capsule; Refill: 0  Urine today positive for leukocytes and blood will place patient on Omnicef as well as sending the urine for culture.  We encouraged fluids.  2. Cloudy urine  -     POCT urinalysis dipstick, automated    3. Bad odor of urine  -     POCT urinalysis dipstick, automated             Follow Up   No follow-ups on file.  Patient was given instructions and counseling regarding her condition or for health maintenance advice. Please see specific information pulled into the AVS if appropriate.

## 2022-07-28 ENCOUNTER — TELEPHONE (OUTPATIENT)
Dept: INTERNAL MEDICINE | Facility: CLINIC | Age: 82
End: 2022-07-28

## 2022-07-28 LAB — BACTERIA SPEC AEROBE CULT: ABNORMAL

## 2022-07-28 NOTE — PROGRESS NOTES
Please call patient and let her know that she did have bacteria in her urine and that her antibiotic should take care of it- is she feeling better? thanks

## 2022-07-28 NOTE — TELEPHONE ENCOUNTER
----- Message from JAI Judd sent at 7/28/2022 12:02 PM EDT -----  Please call patient and let her know that she did have bacteria in her urine and that her antibiotic should take care of it- is she feeling better? thanks

## 2022-08-11 DIAGNOSIS — E03.9 ACQUIRED HYPOTHYROIDISM: ICD-10-CM

## 2022-08-11 RX ORDER — LEVOTHYROXINE SODIUM 0.07 MG/1
TABLET ORAL
Qty: 90 TABLET | Refills: 1 | Status: SHIPPED | OUTPATIENT
Start: 2022-08-11

## 2022-08-11 RX ORDER — LISINOPRIL 10 MG/1
TABLET ORAL
Qty: 90 TABLET | Refills: 1 | Status: SHIPPED | OUTPATIENT
Start: 2022-08-11 | End: 2022-12-01 | Stop reason: SDUPTHER

## 2022-11-28 ENCOUNTER — TELEPHONE (OUTPATIENT)
Dept: URGENT CARE | Facility: CLINIC | Age: 82
End: 2022-11-28

## 2022-12-01 ENCOUNTER — OFFICE VISIT (OUTPATIENT)
Dept: INTERNAL MEDICINE | Facility: CLINIC | Age: 82
End: 2022-12-01

## 2022-12-01 VITALS
DIASTOLIC BLOOD PRESSURE: 74 MMHG | SYSTOLIC BLOOD PRESSURE: 122 MMHG | HEART RATE: 75 BPM | HEIGHT: 64 IN | BODY MASS INDEX: 20.14 KG/M2 | OXYGEN SATURATION: 100 % | TEMPERATURE: 96.9 F | WEIGHT: 118 LBS

## 2022-12-01 DIAGNOSIS — M81.0 AGE-RELATED OSTEOPOROSIS WITHOUT CURRENT PATHOLOGICAL FRACTURE: Chronic | ICD-10-CM

## 2022-12-01 DIAGNOSIS — Z00.00 ROUTINE GENERAL MEDICAL EXAMINATION AT A HEALTH CARE FACILITY: ICD-10-CM

## 2022-12-01 DIAGNOSIS — E55.9 VITAMIN D DEFICIENCY: ICD-10-CM

## 2022-12-01 DIAGNOSIS — M25.561 ACUTE PAIN OF RIGHT KNEE: ICD-10-CM

## 2022-12-01 DIAGNOSIS — E03.9 ACQUIRED HYPOTHYROIDISM: Chronic | ICD-10-CM

## 2022-12-01 DIAGNOSIS — I10 BENIGN ESSENTIAL HYPERTENSION: Chronic | ICD-10-CM

## 2022-12-01 DIAGNOSIS — Z00.00 MEDICARE ANNUAL WELLNESS VISIT, SUBSEQUENT: Primary | ICD-10-CM

## 2022-12-01 PROCEDURE — G0439 PPPS, SUBSEQ VISIT: HCPCS | Performed by: INTERNAL MEDICINE

## 2022-12-01 PROCEDURE — 1125F AMNT PAIN NOTED PAIN PRSNT: CPT | Performed by: INTERNAL MEDICINE

## 2022-12-01 PROCEDURE — 1170F FXNL STATUS ASSESSED: CPT | Performed by: INTERNAL MEDICINE

## 2022-12-01 PROCEDURE — 1159F MED LIST DOCD IN RCRD: CPT | Performed by: INTERNAL MEDICINE

## 2022-12-01 PROCEDURE — 99397 PER PM REEVAL EST PAT 65+ YR: CPT | Performed by: INTERNAL MEDICINE

## 2022-12-01 RX ORDER — RALOXIFENE HYDROCHLORIDE 60 MG/1
60 TABLET, FILM COATED ORAL DAILY
Qty: 30 TABLET | Refills: 11 | Status: SHIPPED | OUTPATIENT
Start: 2022-12-01 | End: 2022-12-19 | Stop reason: SDUPTHER

## 2022-12-01 RX ORDER — LISINOPRIL 10 MG/1
10 TABLET ORAL DAILY
Qty: 30 TABLET | Refills: 5 | Status: SHIPPED | OUTPATIENT
Start: 2022-12-01 | End: 2023-04-04

## 2022-12-06 ENCOUNTER — LAB (OUTPATIENT)
Dept: LAB | Facility: HOSPITAL | Age: 82
End: 2022-12-06

## 2022-12-06 DIAGNOSIS — I10 BENIGN ESSENTIAL HYPERTENSION: Chronic | ICD-10-CM

## 2022-12-06 DIAGNOSIS — E55.9 VITAMIN D DEFICIENCY: ICD-10-CM

## 2022-12-06 DIAGNOSIS — E03.9 ACQUIRED HYPOTHYROIDISM: Chronic | ICD-10-CM

## 2022-12-06 LAB
25(OH)D3 SERPL-MCNC: 49 NG/ML (ref 30–100)
ALBUMIN SERPL-MCNC: 4.4 G/DL (ref 3.5–5.2)
ALBUMIN/GLOB SERPL: 2.1 G/DL
ALP SERPL-CCNC: 59 U/L (ref 39–117)
ALT SERPL W P-5'-P-CCNC: 11 U/L (ref 1–33)
ANION GAP SERPL CALCULATED.3IONS-SCNC: 8.5 MMOL/L (ref 5–15)
AST SERPL-CCNC: 18 U/L (ref 1–32)
BILIRUB SERPL-MCNC: 0.5 MG/DL (ref 0–1.2)
BUN SERPL-MCNC: 23 MG/DL (ref 8–23)
BUN/CREAT SERPL: 22.5 (ref 7–25)
CALCIUM SPEC-SCNC: 9.7 MG/DL (ref 8.6–10.5)
CHLORIDE SERPL-SCNC: 106 MMOL/L (ref 98–107)
CHOLEST SERPL-MCNC: 251 MG/DL (ref 0–200)
CO2 SERPL-SCNC: 26.5 MMOL/L (ref 22–29)
CREAT SERPL-MCNC: 1.02 MG/DL (ref 0.57–1)
DEPRECATED RDW RBC AUTO: 43.4 FL (ref 37–54)
EGFRCR SERPLBLD CKD-EPI 2021: 55.7 ML/MIN/1.73
ERYTHROCYTE [DISTWIDTH] IN BLOOD BY AUTOMATED COUNT: 12.5 % (ref 12.3–15.4)
GLOBULIN UR ELPH-MCNC: 2.1 GM/DL
GLUCOSE SERPL-MCNC: 85 MG/DL (ref 65–99)
HCT VFR BLD AUTO: 45.6 % (ref 34–46.6)
HDLC SERPL-MCNC: 111 MG/DL (ref 40–60)
HGB BLD-MCNC: 15.6 G/DL (ref 12–15.9)
LDLC SERPL CALC-MCNC: 123 MG/DL (ref 0–100)
LDLC/HDLC SERPL: 1.08 {RATIO}
MCH RBC QN AUTO: 32.5 PG (ref 26.6–33)
MCHC RBC AUTO-ENTMCNC: 34.2 G/DL (ref 31.5–35.7)
MCV RBC AUTO: 95 FL (ref 79–97)
PLATELET # BLD AUTO: 173 10*3/MM3 (ref 140–450)
PMV BLD AUTO: 10 FL (ref 6–12)
POTASSIUM SERPL-SCNC: 3.8 MMOL/L (ref 3.5–5.2)
PROT SERPL-MCNC: 6.5 G/DL (ref 6–8.5)
RBC # BLD AUTO: 4.8 10*6/MM3 (ref 3.77–5.28)
SODIUM SERPL-SCNC: 141 MMOL/L (ref 136–145)
TRIGL SERPL-MCNC: 103 MG/DL (ref 0–150)
TSH SERPL DL<=0.05 MIU/L-ACNC: 2.96 UIU/ML (ref 0.27–4.2)
VLDLC SERPL-MCNC: 17 MG/DL (ref 5–40)
WBC NRBC COR # BLD: 5.51 10*3/MM3 (ref 3.4–10.8)

## 2022-12-06 PROCEDURE — 84443 ASSAY THYROID STIM HORMONE: CPT

## 2022-12-06 PROCEDURE — 85027 COMPLETE CBC AUTOMATED: CPT

## 2022-12-06 PROCEDURE — 82306 VITAMIN D 25 HYDROXY: CPT

## 2022-12-06 PROCEDURE — 80053 COMPREHEN METABOLIC PANEL: CPT

## 2022-12-06 PROCEDURE — 80061 LIPID PANEL: CPT

## 2022-12-19 DIAGNOSIS — M81.0 AGE-RELATED OSTEOPOROSIS WITHOUT CURRENT PATHOLOGICAL FRACTURE: Chronic | ICD-10-CM

## 2022-12-19 RX ORDER — RALOXIFENE HYDROCHLORIDE 60 MG/1
60 TABLET, FILM COATED ORAL DAILY
Qty: 90 TABLET | Refills: 3 | Status: SHIPPED | OUTPATIENT
Start: 2022-12-19 | End: 2023-01-11

## 2023-01-11 PROCEDURE — 87088 URINE BACTERIA CULTURE: CPT | Performed by: FAMILY MEDICINE

## 2023-01-11 PROCEDURE — 87086 URINE CULTURE/COLONY COUNT: CPT | Performed by: FAMILY MEDICINE

## 2023-01-11 PROCEDURE — 87186 SC STD MICRODIL/AGAR DIL: CPT | Performed by: FAMILY MEDICINE

## 2023-01-17 ENCOUNTER — OFFICE VISIT (OUTPATIENT)
Dept: ORTHOPEDIC SURGERY | Facility: CLINIC | Age: 83
End: 2023-01-17
Payer: MEDICARE

## 2023-01-17 VITALS
DIASTOLIC BLOOD PRESSURE: 86 MMHG | BODY MASS INDEX: 19.97 KG/M2 | HEIGHT: 64 IN | WEIGHT: 117 LBS | SYSTOLIC BLOOD PRESSURE: 142 MMHG

## 2023-01-17 DIAGNOSIS — S89.91XA INJURY OF RIGHT KNEE, INITIAL ENCOUNTER: Primary | ICD-10-CM

## 2023-01-17 DIAGNOSIS — M17.11 PRIMARY OSTEOARTHRITIS OF RIGHT KNEE: ICD-10-CM

## 2023-01-17 PROCEDURE — 99204 OFFICE O/P NEW MOD 45 MIN: CPT | Performed by: ORTHOPAEDIC SURGERY

## 2023-01-17 RX ORDER — ALPRAZOLAM 0.25 MG/1
0.25 TABLET ORAL
Qty: 1 TABLET | Refills: 0 | Status: SHIPPED | OUTPATIENT
Start: 2023-01-17 | End: 2023-01-17

## 2023-01-17 NOTE — PROGRESS NOTES
[facility] Note        Subjective     Pain of the Right Knee (DOI 11/19/22)      HPI  Nicole Ruiz is a 82 y.o. female who presents with new problem of: right knee pain.  Onset: twisting injury. The issue has been ongoing for 2 month(s). Pain is a 5/10 on the pain scale. Pain is described as burning. Associated symptoms include pain and stiffness. The pain is worse with sitting and sleeping; heat improve the pain. Previous treatments have included: NSAIDS.    I have reviewed the following portions of the patient's history:History of Present Illness and review of systems.      Patient is here today for new problem today regarding her right knee.  In November, she twisted her knee.  She went to the urgent treatment center.  They started her on diclofenac.  In a routine follow-up with her PCP, Dr. Tee, she was diagnosed with some cystitis so the diclofenac was discontinued regularly and made a as needed medication.  Patient has medial sided knee pain.  She has difficulty twisting still.  She is quite active and has been a little bit reluctant to be as active because of not wanting to make her knee worse.  She typically walks for exercise and does a lot of housework.  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      Past Medical History:   Diagnosis Date   • Age-related osteoporosis without current pathological fracture     Received Reclast in 2019 once   • Benign essential hypertension    • H/O bone density study 07/2015    slight worse osteopenia in hip, osteoporosis in wrist, forteo in 06-08   • H/O bone density study 05/11/2017   • H/O colonoscopy 02/2015    Dr. Mccartney - [facility]   • H/O mammogram 09/24/2018     Breast [facility]   • History of echocardiogram 01/2015    [facility] - nml - no MVP   • Hypothyroidism    • Migraine    • Osteoarthritis    • Pap smear for cervical cancer screening 11/2012   • Postmenopausal     HRT x 2 years      Past  Surgical History:   Procedure Laterality Date   • CATARACT EXTRACTION Left 04/2018    Dr. Jamil   • CATARACT EXTRACTION Right 07/2021   • RETINAL DETACHMENT SURGERY Left 06/01/2013    Dr. Us; Dr. Garcia in her opthamologist   • ROOT CANAL  2018    [facility] also   • TONSILLECTOMY AND ADENOIDECTOMY  1946   • TOTAL HIP ARTHROPLASTY Right 12/15/2010      Family History   Problem Relation Age of Onset   • Alzheimer's disease Mother    • Hypertension Mother    • Other Mother         hip fracture   • Heart failure Father         congestivem 70;s   • Coronary artery disease Father 84   • Diabetes type I Sister    • Hypertension Brother    • Hypertension Brother    • Hyperlipidemia Brother      Social History     Socioeconomic History   • Marital status:    Tobacco Use   • Smoking status: Never   • Smokeless tobacco: Never   Vaping Use   • Vaping Use: Never used   Substance and Sexual Activity   • Alcohol use: Not Currently     Comment:  2/year   • Drug use: No   • Sexual activity: Defer      Current Outpatient Medications on File Prior to Visit   Medication Sig Dispense Refill   • cefdinir (OMNICEF) 300 MG capsule Take 1 capsule by mouth 2 (Two) Times a Day for 7 days. 14 capsule 0   • diazePAM (VALIUM) 5 MG tablet Take 1 tablet by mouth At Night As Needed for Anxiety. 30 tablet 2   • levothyroxine (SYNTHROID, LEVOTHROID) 75 MCG tablet TAKE 1 TABLET DAILY 90 tablet 1   • lisinopril (PRINIVIL,ZESTRIL) 10 MG tablet Take 1 tablet by mouth Daily. 30 tablet 5     No current facility-administered medications on file prior to visit.      Allergies   Allergen Reactions   • Fosamax [Alendronate Sodium] Nausea Only          Review of Systems   Constitutional: Negative.    HENT: Negative.    Eyes: Negative.    Respiratory: Negative.    Cardiovascular: Negative.    Gastrointestinal: Negative.    Endocrine: Negative.    Genitourinary: Negative.    Musculoskeletal: Positive for arthralgias.   Skin: Negative.   "  Allergic/Immunologic: Negative.    Neurological: Negative.    Hematological: Negative.    Psychiatric/Behavioral: Negative.         I reviewed the patient's chief complaint, history of present illness, review of systems, past medical history, surgical history, family history, social history, medications and allergy list.        Objective      Physical Exam  /86   Ht 162.6 cm (64\")   Wt 53.1 kg (117 lb)   BMI 20.08 kg/m²     Body mass index is 20.08 kg/m².    General  Mental Status - alert  General Appearance - cooperative, well groomed, not in acute distress  Orientation - Oriented X3  Build & Nutrition - well developed and well nourished  Posture - normal posture  Gait - normal gait       Ortho Exam      Musculoskeletal:  Global Assessment:  Overall assessment of Lower Extremity Muscle Strength and Tone:    Right quadriceps--5/5  Right hamstrings--5/5  Right tibialis anterior--5/5  Right gastroc soleus--5/5  Right EHL--5/5    Lower Extremity:    Right knee:  Tenderness:  Over medial joint line  Effusion:  1+  Crepitus:  none  Pulses:  2+  Ecchymosis:  None  Warmth:  None     ROM:  Right:  Extension:0    Flexion:130    Instability:      Right:  Lachman Test:  Negative  Varus stress test negative  Valgus stress test negative   Anterior Drawer Test:  Negative  Posterior Drawer Test:  Negative    Functional Testing:  Right:  Phuc's test:  Positive  Patella grind test:  Negative  Q-angle:  Normal  Apprehension Sign:  Negative      Imaging/Studies  Imaging Results (Last 24 Hours)     ** No results found for the last 24 hours. **      XR Knee 1 or 2 View Right  Narrative: DATE OF EXAM: 11/19/2022 10:09 AM     PROCEDURE: XR KNEE 1 OR 2 VW RIGHT-     INDICATIONS: Right knee pain without injury; M25.561-Pain in right knee     COMPARISON: 07/07/2021     TECHNIQUE: One to two radiologic views of the right knee were obtained.     FINDINGS:  No acute fracture is identified. No focal osseous abnormality " is  identified. There is moderate tricompartmental osteoarthritis most  prominent along the patellofemoral compartment. No suprapatellar joint  effusion is identified.      Impression: 1.  No acute osseous process identified.  2.  Degenerative changes as described above.     This report was finalized on 11/19/2022 10:25 AM by Samm Ricketts MD.     We reviewed images and report of the x-ray above.  Patient appears to have severe patellofemoral arthritis but relatively good joint space preservation in the medial lateral compartments.      Assessment    Assessment:  1. Injury of right knee, initial encounter    2. Primary osteoarthritis of right knee        Plan:  1. Continue over-the-counter medication as needed for discomfort  2. Patellofemoral arthritis--observe for now.  Patient's symptoms are primarily medial in location.  3. Right knee injury--concern is for medial meniscal tear.  MRI be requested.  See her back review the study.  Patient will be given a Xanax to help her tolerate the MRI because of claustrophobia.  She is not benzodiazepine naïve as she occasionally takes diazepam.  I told her she can break the pill in half if she would like.  See her back to review the MRI.        Greyson Loya MD  01/17/23  08:40 EST      Dictated Utilizing Dragon Dictation.

## 2023-04-03 DIAGNOSIS — I10 BENIGN ESSENTIAL HYPERTENSION: Chronic | ICD-10-CM

## 2023-04-04 RX ORDER — LISINOPRIL 10 MG/1
TABLET ORAL
Qty: 90 TABLET | Refills: 0 | Status: SHIPPED | OUTPATIENT
Start: 2023-04-04

## 2023-05-01 DIAGNOSIS — E03.9 ACQUIRED HYPOTHYROIDISM: ICD-10-CM

## 2023-05-01 RX ORDER — LEVOTHYROXINE SODIUM 0.07 MG/1
TABLET ORAL
Qty: 90 TABLET | Refills: 0 | Status: SHIPPED | OUTPATIENT
Start: 2023-05-01

## 2023-06-01 ENCOUNTER — LAB (OUTPATIENT)
Dept: INTERNAL MEDICINE | Facility: CLINIC | Age: 83
End: 2023-06-01

## 2023-06-01 ENCOUNTER — OFFICE VISIT (OUTPATIENT)
Dept: INTERNAL MEDICINE | Facility: CLINIC | Age: 83
End: 2023-06-01

## 2023-06-01 VITALS
WEIGHT: 118 LBS | BODY MASS INDEX: 20.14 KG/M2 | SYSTOLIC BLOOD PRESSURE: 122 MMHG | HEART RATE: 72 BPM | HEIGHT: 64 IN | TEMPERATURE: 97.8 F | DIASTOLIC BLOOD PRESSURE: 70 MMHG | OXYGEN SATURATION: 97 %

## 2023-06-01 DIAGNOSIS — I10 BENIGN ESSENTIAL HYPERTENSION: Chronic | ICD-10-CM

## 2023-06-01 DIAGNOSIS — E03.9 ACQUIRED HYPOTHYROIDISM: Primary | Chronic | ICD-10-CM

## 2023-06-01 DIAGNOSIS — F51.01 PRIMARY INSOMNIA: Chronic | ICD-10-CM

## 2023-06-01 DIAGNOSIS — E03.9 ACQUIRED HYPOTHYROIDISM: Chronic | ICD-10-CM

## 2023-06-01 LAB
ANION GAP SERPL CALCULATED.3IONS-SCNC: 10.9 MMOL/L (ref 5–15)
BUN SERPL-MCNC: 17 MG/DL (ref 8–23)
BUN/CREAT SERPL: 20.5 (ref 7–25)
CALCIUM SPEC-SCNC: 9.5 MG/DL (ref 8.6–10.5)
CHLORIDE SERPL-SCNC: 102 MMOL/L (ref 98–107)
CO2 SERPL-SCNC: 25.1 MMOL/L (ref 22–29)
CREAT SERPL-MCNC: 0.83 MG/DL (ref 0.57–1)
EGFRCR SERPLBLD CKD-EPI 2021: 70.9 ML/MIN/1.73
GLUCOSE SERPL-MCNC: 92 MG/DL (ref 65–99)
POTASSIUM SERPL-SCNC: 4.1 MMOL/L (ref 3.5–5.2)
SODIUM SERPL-SCNC: 138 MMOL/L (ref 136–145)
TSH SERPL DL<=0.05 MIU/L-ACNC: 1.41 UIU/ML (ref 0.27–4.2)

## 2023-06-01 PROCEDURE — 1159F MED LIST DOCD IN RCRD: CPT | Performed by: INTERNAL MEDICINE

## 2023-06-01 PROCEDURE — 3078F DIAST BP <80 MM HG: CPT | Performed by: INTERNAL MEDICINE

## 2023-06-01 PROCEDURE — 99214 OFFICE O/P EST MOD 30 MIN: CPT | Performed by: INTERNAL MEDICINE

## 2023-06-01 PROCEDURE — 3074F SYST BP LT 130 MM HG: CPT | Performed by: INTERNAL MEDICINE

## 2023-06-01 PROCEDURE — 84443 ASSAY THYROID STIM HORMONE: CPT | Performed by: INTERNAL MEDICINE

## 2023-06-01 PROCEDURE — 80048 BASIC METABOLIC PNL TOTAL CA: CPT | Performed by: INTERNAL MEDICINE

## 2023-06-01 PROCEDURE — 36415 COLL VENOUS BLD VENIPUNCTURE: CPT | Performed by: INTERNAL MEDICINE

## 2023-06-01 PROCEDURE — 1160F RVW MEDS BY RX/DR IN RCRD: CPT | Performed by: INTERNAL MEDICINE

## 2023-06-01 RX ORDER — DIAZEPAM 5 MG/1
5 TABLET ORAL NIGHTLY PRN
Qty: 30 TABLET | Refills: 2 | Status: SHIPPED | OUTPATIENT
Start: 2023-06-01

## 2023-06-01 RX ORDER — LISINOPRIL 10 MG/1
10 TABLET ORAL DAILY
Qty: 90 TABLET | Refills: 1 | Status: SHIPPED | OUTPATIENT
Start: 2023-06-01

## 2023-06-01 NOTE — PROGRESS NOTES
"Chief Complaint  Hypothyroidism, Hypertension, Insomnia, and Med Refill    Subjective          Nicole Ruiz presents to [facility]  History of Present Illness     The patient presents today for a follow-up on hypertension and hypothyroidism.     Hypertension-patient checks blood pressure periodically at home and has been similar to today's reading.  She feels well overall and is taking lisinopril 10 mg daily.  She stays very active with walking.    Right knee pain  She saw Dr. Loya in 01/2023 and he was going to schedule  Night but her pain has resolved she can ambulate and exercise without pain.  She is not taking any over-the-counter NSAIDs and rarely will take a Tylenol    Anxiety/insomnia-  She takes Valium occasionally for sleep or anxiety.  Still has some but would like to go ahead and get a refill today while she is here.  Last filled in 11/22 for 30    Osteopenia-patient cannot tolerate Evista because of leg cramps so is off of it    Current Outpatient Medications:   •  diazePAM (VALIUM) 5 MG tablet, Take 1 tablet by mouth At Night As Needed for Anxiety., Disp: 30 tablet, Rfl: 2  •  levothyroxine (SYNTHROID, LEVOTHROID) 75 MCG tablet, TAKE 1 TABLET DAILY, Disp: 90 tablet, Rfl: 0  •  lisinopril (PRINIVIL,ZESTRIL) 10 MG tablet, Take 1 tablet by mouth Daily., Disp: 90 tablet, Rfl: 1   The following portions of the patient's history were reviewed and updated as appropriate: allergies, current medications, past family history, past medical history, past social history, past surgical history and problem list.     Objective   Vital Signs:   /70 (BP Location: Left arm, Patient Position: Sitting)   Pulse 72   Temp 97.8 °F (36.6 °C) (Infrared)   Ht 162.6 cm (64\")   Wt 53.5 kg (118 lb)   SpO2 97%   BMI 20.25 kg/m²       Physical exam  Constitutional: oriented to person, place, and time.  well-developed and well-nourished. No distress.   HENT:   Head: Normocephalic and " atraumatic.   Eyes: Conjunctivae and EOM are normal.   Cardiovascular: Normal rate, regular rhythm and normal heart sounds.  Exam reveals no gallop and no friction rub.    No murmur heard.  Pulmonary/Chest: Effort normal and breath sounds normal. No respiratory distress.   no wheezes.   Neurological:  alert and oriented to person, place, and time.   Skin: Skin is warm and dry. not diaphoretic.   Psychiatric:  normal mood and affect. behavior is normal. Judgment and thought content normal.      Result Review :   The following data was reviewed by: Cherelle Tee MD on 06/01/2023:  CMP        12/6/2022    08:27   CMP   Glucose 85     BUN 23     Creatinine 1.02     EGFR 55.7     Sodium 141     Potassium 3.8     Chloride 106     Calcium 9.7     Total Protein 6.5     Albumin 4.40     Globulin 2.1     Total Bilirubin 0.5     Alkaline Phosphatase 59     AST (SGOT) 18     ALT (SGPT) 11     Albumin/Globulin Ratio 2.1     BUN/Creatinine Ratio 22.5     Anion Gap 8.5       CBC w/diff        12/6/2022    08:27   CBC w/Diff   WBC 5.51     RBC 4.80     Hemoglobin 15.6     Hematocrit 45.6     MCV 95.0     MCH 32.5     MCHC 34.2     RDW 12.5     Platelets 173       Lipid Panel        12/6/2022    08:27   Lipid Panel   Total Cholesterol 251     Triglycerides 103     HDL Cholesterol 111     VLDL Cholesterol 17     LDL Cholesterol  123     LDL/HDL Ratio 1.08       TSH        12/6/2022    08:27   TSH   TSH 2.960           Lab Results   Component Value Date    [id number] 49.0 12/06/2022               Assessment and Plan    Diagnoses and all orders for this visit:    1. Acquired hypothyroidism (Primary)-check levels  -     TSH; Future    2. Benign essential hypertension-good control.  Recheck BMP today  -     Basic Metabolic Panel; Future  -     lisinopril (PRINIVIL,ZESTRIL) 10 MG tablet; Take 1 tablet by mouth Daily.  Dispense: 90 tablet; Refill: 1    3. Primary insomnia/anxiety  Comments:  Stable on as needed Valium.  She has signed CSC,  Michael appropriate.  Refill today  Orders:  -     diazePAM (VALIUM) 5 MG tablet; Take 1 tablet by mouth At Night As Needed for Anxiety.  Dispense: 30 tablet; Refill: 2        Follow Up   Return in about 6 months (around 12/1/2023) for Medicare Wellness.  Patient was given instructions and counseling regarding her condition or for health maintenance advice. Please see specific information pulled into the AVS if appropriate.       Transcribed from ambient dictation for Cherelle Tee MD by Basilio Caceres.  06/01/23   14:31 EDT    Patient or patient representative verbalized consent to the visit recording.  I have personally performed the services described in this document as transcribed by the above individual, and it is both accurate and complete.

## 2023-07-31 DIAGNOSIS — E03.9 ACQUIRED HYPOTHYROIDISM: ICD-10-CM

## 2023-07-31 RX ORDER — LEVOTHYROXINE SODIUM 0.07 MG/1
TABLET ORAL
Qty: 90 TABLET | Refills: 3 | Status: SHIPPED | OUTPATIENT
Start: 2023-07-31

## 2023-12-04 NOTE — PROGRESS NOTES
Subsequent Medicare Wellness Visit    Subjective    Nicole Ruiz is a 83 y.o. female who presents for a Subsequent Medicare Wellness Visit and physical    The following portions of the patient's history were reviewed and   updated as appropriate: allergies, current medications, past family history, past medical history, past social history, past surgical history, and problem list.    Compared to one year ago, the patient feels her physical   health is the same.    Compared to one year ago, the patient feels her mental   health is the same.    Recent Hospitalizations:  She was not admitted to the hospital during the last year.       Current Medical Providers:  Patient Care Team:  Cherelle Tee MD as PCP - General (Internal Medicine)  Kiko Mccartney MD as Consulting Physician (Gastroenterology)  Samm Perez MD as Consulting Physician (Ophthalmology)  Ajit Kenny DMD (Dental General Practice)  Samm Hassan MD (Orthopedic Surgery)    Outpatient Medications Prior to Visit   Medication Sig Dispense Refill    levothyroxine (SYNTHROID, LEVOTHROID) 75 MCG tablet TAKE 1 TABLET DAILY 90 tablet 3    diazePAM (VALIUM) 5 MG tablet Take 1 tablet by mouth At Night As Needed for Anxiety. 30 tablet 2    lisinopril (PRINIVIL,ZESTRIL) 10 MG tablet Take 1 tablet by mouth Daily. 90 tablet 1    sertraline (Zoloft) 25 MG tablet Take 1 tablet by mouth Daily. 30 tablet 11     No facility-administered medications prior to visit.       No opioid medication identified on active medication list. I have reviewed chart for other potential  high risk medication/s and harmful drug interactions in the elderly.          Aspirin is not on active medication list.  Aspirin use is not indicated based on review of current medical condition/s. Risk of harm outweighs potential benefits.  .    Patient Active Problem List   Diagnosis    Benign essential hypertension    Age-related osteoporosis without current pathological fracture  "   Acquired hypothyroidism    Migraine without status migrainosus    Vitiligo    Localized osteoarthrosis, lower leg    Anxiety    Chronic pain of right knee    Age-related cataract of right eye    Muscle spasm    Acute right-sided thoracic back pain     Advance Care Planning  Advance Directive is not on file.  ACP discussion was held with the patient during this visit. Pt has and is on file     Objective    Vitals:    23 1300   BP: 114/74   BP Location: Right arm   Patient Position: Sitting   Pulse: 68   Temp: 97.1 °F (36.2 °C)   TempSrc: Infrared   SpO2: 98%   Weight: 52.6 kg (116 lb)   Height: 161.7 cm (63.65\")   PainSc: 0-No pain     Estimated body mass index is 20.13 kg/m² as calculated from the following:    Height as of this encounter: 161.7 cm (63.65\").    Weight as of this encounter: 52.6 kg (116 lb).    BMI is >= 25 and <30. (Overweight) The following options were offered after discussion;: normal BMI      Does the patient have evidence of cognitive impairment? No          HEALTH RISK ASSESSMENT    Smoking Status:  Social History     Tobacco Use   Smoking Status Never   Smokeless Tobacco Never     Alcohol Consumption:  Social History     Substance and Sexual Activity   Alcohol Use Not Currently    Comment:  2/year     Fall Risk Screen:    TRISTEN Fall Risk Assessment was completed, and patient is at LOW risk for falls.Assessment completed on:2023    Depression Screenin/6/2023    12:52 PM   PHQ-2/PHQ-9 Depression Screening   Little Interest or Pleasure in Doing Things 0-->not at all   Feeling Down, Depressed or Hopeless 0-->not at all   PHQ-9: Brief Depression Severity Measure Score 0       Health Habits and Functional and Cognitive Screenin/6/2023    12:51 PM   Functional & Cognitive Status   Do you have difficulty preparing food and eating? No   Do you have difficulty bathing yourself, getting dressed or grooming yourself? No   Do you have difficulty using the toilet? No   Do " you have difficulty moving around from place to place? No   Do you have trouble with steps or getting out of a bed or a chair? No   Current Diet Well Balanced Diet   Dental Exam Up to date   Eye Exam Up to date   Exercise (times per week) 3 times per week   Current Exercises Include Walking   Do you need help using the phone?  No   Are you deaf or do you have serious difficulty hearing?  No   Do you need help to go to places out of walking distance? No   Do you need help shopping? No   Do you need help preparing meals?  No   Do you need help with housework?  No   Do you need help with laundry? No   Do you need help taking your medications? No   Do you need help managing money? No   Do you ever drive or ride in a car without wearing a seat belt? No   Have you felt unusual stress, anger or loneliness in the last month? No   Who do you live with? Spouse   If you need help, do you have trouble finding someone available to you? No   Have you been bothered in the last four weeks by sexual problems? No   Do you have difficulty concentrating, remembering or making decisions? No       Age-appropriate Screening Schedule:  Refer to the list below for future screening recommendations based on patient's age, sex and/or medical conditions. Orders for these recommended tests are listed in the plan section. The patient has been provided with a written plan.    Health Maintenance   Topic Date Due    TDAP/TD VACCINES (2 - Td or Tdap) 11/25/2023    ANNUAL WELLNESS VISIT  12/01/2023    DXA SCAN  04/04/2024    COLORECTAL CANCER SCREENING  02/03/2025    COVID-19 Vaccine  Completed    INFLUENZA VACCINE  Completed    Pneumococcal Vaccine 65+  Completed    ZOSTER VACCINE  Completed                CMS Preventative Services Quick Reference  Risk Factors Identified During Encounter  None Identified  The above risks/problems have been discussed with the patient.  Pertinent information has been shared with the patient in the After Visit  Summary.  An After Visit Summary and PPPS were made available to the patient.    Follow Up:   Next Medicare Wellness visit to be scheduled in 1 year.       Additional E&M Note during same encounter follows:  Patient has multiple medical problems which are significant and separately identifiable that require additional work above and beyond the Medicare Wellness Visit.      Chief Complaint  Medicare Wellness-subsequent (Wellness), Annual Exam (Physical), Hypertension, Med Refill, and Immunizations (Discuss RSV vaccine.)    Subjective        HPI  Nicole Ruiz is also being seen today for:    diet -healthy -healthy overall with plenty of fruits/veggies.  She would like to try to gain some weight.  She is on cA/d3 as well as a joint select (glucosamine/hyaluronic)  exericse -regularly, does jakub chi, walks, weights, bands     Hypertension-she is on lisinopril. She does check about every 2 weeks and generally has been well-controlled    Hypothyroid-she is on Synthroid . Wt is stable, energy level good     Anxiety-we did start zoloft 25 earlier this year but she had to stop after a few days as she felt nauseated. She also does use Valium occasionally- maybe 2-3x/week, usually 1/2 tablet at a time and does help with anxiety and sleep.  She feels like she is managing okay so would not want to try another SSRI     Osteoporosis-she did get Reclast in '19,  but had achiness for about 6 months so did not do in '20.We did restart evista in 4/22 but she had more and cramps with it so has stopped. She does take calcium/D and her vitamin D level looked good on previous checks.  She does walk regularly    knee pain -has improved.     Review of Systems   Constitutional:  Negative for activity change, appetite change, fatigue and fever.   Respiratory:  Negative for shortness of breath.    Cardiovascular:  Negative for chest pain and leg swelling.   Gastrointestinal: Negative.    Endocrine: Negative.    Genitourinary: Negative.   "  Musculoskeletal:  Positive for arthralgias (knee last year but is better).   Allergic/Immunologic: Negative for immunocompromised state.   Neurological:  Negative for seizures, syncope, speech difficulty, weakness and confusion.   Psychiatric/Behavioral:  The patient is nervous/anxious (managing; stress).        Objective   Vital Signs:  /74 (BP Location: Right arm, Patient Position: Sitting)   Pulse 68   Temp 97.1 °F (36.2 °C) (Infrared)   Ht 161.7 cm (63.65\")   Wt 52.6 kg (116 lb)   SpO2 98%   BMI 20.13 kg/m²     Physical Exam  Vitals and nursing note reviewed.   Constitutional:       General: She is not in acute distress.     Appearance: She is well-developed. She is not diaphoretic.   HENT:      Head: Normocephalic and atraumatic.      Right Ear: External ear normal.      Left Ear: External ear normal.      Nose: Nose normal.      Mouth/Throat:      Pharynx: No oropharyngeal exudate.   Eyes:      General: No scleral icterus.        Right eye: No discharge.         Left eye: No discharge.      Conjunctiva/sclera: Conjunctivae normal.      Pupils: Pupils are equal, round, and reactive to light.   Neck:      Thyroid: No thyromegaly.   Cardiovascular:      Rate and Rhythm: Normal rate and regular rhythm.      Heart sounds: Normal heart sounds. No murmur heard.     No friction rub. No gallop.   Pulmonary:      Effort: Pulmonary effort is normal. No respiratory distress.      Breath sounds: Normal breath sounds. No wheezing or rales.   Chest:   Breasts:     Right: No mass, nipple discharge, skin change or tenderness.      Left: No mass, nipple discharge, skin change or tenderness.   Abdominal:      General: Bowel sounds are normal. There is no distension.      Palpations: Abdomen is soft. There is no mass.      Tenderness: There is no abdominal tenderness. There is no guarding or rebound.   Musculoskeletal:         General: No deformity. Normal range of motion.      Cervical back: Normal range of motion " and neck supple.   Lymphadenopathy:      Cervical: No cervical adenopathy.   Skin:     General: Skin is warm and dry.      Coloration: Skin is not pale.      Findings: No erythema or rash.   Neurological:      Mental Status: She is alert and oriented to person, place, and time.      Coordination: Coordination normal.      Deep Tendon Reflexes: Reflexes normal.   Psychiatric:         Behavior: Behavior normal.         Thought Content: Thought content normal.         Judgment: Judgment normal.                         Assessment and Plan   Diagnoses and all orders for this visit:    1. Encounter for Medicare annual wellness exam (Primary)  Regular exercise/healthy diet. BSE q month. Sunscreen use encouraged.   Living will -pt has and is on file  Addie -due 9/24  Colon done in '15, due in '25- probably won't do any more since over 82  Pneumovax - had in '20  prevnar- had  covid - she had  Flu shot - she had  DT due this year  DEXA due 4/24 (she had one dose of reclast but did not tolerate)  Shingles - she had shingrix  Does not need paps since age 65 or older and has had normal paps in past  Gave her a couple samples of protein drinks (Ensure and Glucerna) to try to supplement her diet     2. Routine general medical examination at a health care facility  -     CBC (No Diff); Future  -     TSH Rfx On Abnormal To Free T4; Future  -     Lipid Panel; Future  -     Comprehensive Metabolic Panel; Future  -     Cancel: POC Urinalysis Dipstick, Automated    3. Benign essential hypertension-well-controlled  -     CBC (No Diff); Future  -     Lipid Panel; Future  -     Comprehensive Metabolic Panel; Future  -     lisinopril (PRINIVIL,ZESTRIL) 10 MG tablet; Take 1 tablet by mouth Daily.  Dispense: 90 tablet; Refill: 1    4. Acquired hypothyroidism-check thyroid levels  -     TSH Rfx On Abnormal To Free T4; Future    5. Age-related osteoporosis without current pathological fracture-did not tolerate medication.  We will recheck DEXA  next year    6. Postmenopausal    7. Primary insomnia/anxiety-she does not want to try another SSRI and overall is doing well and feels like she is managing.  Comments:  Stable on as needed Valium.  She has signed Michael FERNANDEZ appropriate.  Refill today  Orders:  -     diazePAM (VALIUM) 5 MG tablet; Take 1 tablet by mouth At Night As Needed for Anxiety.  Dispense: 30 tablet; Refill: 2             Follow Up   Return in about 6 months (around 6/6/2024) for Next scheduled follow up.  Patient was given instructions and counseling regarding her condition or for health maintenance advice. Please see specific information pulled into the AVS if appropriate.

## 2023-12-06 ENCOUNTER — OFFICE VISIT (OUTPATIENT)
Dept: INTERNAL MEDICINE | Facility: CLINIC | Age: 83
End: 2023-12-06
Payer: MEDICARE

## 2023-12-06 VITALS
HEART RATE: 68 BPM | DIASTOLIC BLOOD PRESSURE: 74 MMHG | OXYGEN SATURATION: 98 % | SYSTOLIC BLOOD PRESSURE: 114 MMHG | TEMPERATURE: 97.1 F | HEIGHT: 64 IN | BODY MASS INDEX: 19.81 KG/M2 | WEIGHT: 116 LBS

## 2023-12-06 DIAGNOSIS — E03.9 ACQUIRED HYPOTHYROIDISM: Chronic | ICD-10-CM

## 2023-12-06 DIAGNOSIS — I10 BENIGN ESSENTIAL HYPERTENSION: Chronic | ICD-10-CM

## 2023-12-06 DIAGNOSIS — F51.01 PRIMARY INSOMNIA: Chronic | ICD-10-CM

## 2023-12-06 DIAGNOSIS — Z78.0 POSTMENOPAUSAL: ICD-10-CM

## 2023-12-06 DIAGNOSIS — Z00.00 ROUTINE GENERAL MEDICAL EXAMINATION AT A HEALTH CARE FACILITY: ICD-10-CM

## 2023-12-06 DIAGNOSIS — M81.0 AGE-RELATED OSTEOPOROSIS WITHOUT CURRENT PATHOLOGICAL FRACTURE: Chronic | ICD-10-CM

## 2023-12-06 DIAGNOSIS — Z00.00 ENCOUNTER FOR MEDICARE ANNUAL WELLNESS EXAM: Primary | ICD-10-CM

## 2023-12-06 PROCEDURE — 3074F SYST BP LT 130 MM HG: CPT | Performed by: INTERNAL MEDICINE

## 2023-12-06 PROCEDURE — 3078F DIAST BP <80 MM HG: CPT | Performed by: INTERNAL MEDICINE

## 2023-12-06 PROCEDURE — 1160F RVW MEDS BY RX/DR IN RCRD: CPT | Performed by: INTERNAL MEDICINE

## 2023-12-06 PROCEDURE — G0439 PPPS, SUBSEQ VISIT: HCPCS | Performed by: INTERNAL MEDICINE

## 2023-12-06 PROCEDURE — 1170F FXNL STATUS ASSESSED: CPT | Performed by: INTERNAL MEDICINE

## 2023-12-06 PROCEDURE — 1159F MED LIST DOCD IN RCRD: CPT | Performed by: INTERNAL MEDICINE

## 2023-12-06 PROCEDURE — 99397 PER PM REEVAL EST PAT 65+ YR: CPT | Performed by: INTERNAL MEDICINE

## 2023-12-06 RX ORDER — DIAZEPAM 5 MG/1
5 TABLET ORAL NIGHTLY PRN
Qty: 30 TABLET | Refills: 2 | Status: SHIPPED | OUTPATIENT
Start: 2023-12-06

## 2023-12-06 RX ORDER — LISINOPRIL 10 MG/1
10 TABLET ORAL DAILY
Qty: 90 TABLET | Refills: 1 | Status: SHIPPED | OUTPATIENT
Start: 2023-12-06

## 2023-12-12 ENCOUNTER — LAB (OUTPATIENT)
Dept: INTERNAL MEDICINE | Facility: CLINIC | Age: 83
End: 2023-12-12
Payer: MEDICARE

## 2023-12-12 DIAGNOSIS — Z00.00 ROUTINE GENERAL MEDICAL EXAMINATION AT A HEALTH CARE FACILITY: ICD-10-CM

## 2023-12-12 DIAGNOSIS — I10 BENIGN ESSENTIAL HYPERTENSION: Chronic | ICD-10-CM

## 2023-12-12 DIAGNOSIS — E03.9 ACQUIRED HYPOTHYROIDISM: Chronic | ICD-10-CM

## 2023-12-12 LAB
ALBUMIN SERPL-MCNC: 4.3 G/DL (ref 3.5–5.2)
ALBUMIN/GLOB SERPL: 1.6 G/DL
ALP SERPL-CCNC: 64 U/L (ref 39–117)
ALT SERPL W P-5'-P-CCNC: 11 U/L (ref 1–33)
ANION GAP SERPL CALCULATED.3IONS-SCNC: 10.8 MMOL/L (ref 5–15)
AST SERPL-CCNC: 25 U/L (ref 1–32)
BILIRUB SERPL-MCNC: 0.6 MG/DL (ref 0–1.2)
BUN SERPL-MCNC: 17 MG/DL (ref 8–23)
BUN/CREAT SERPL: 21.3 (ref 7–25)
CALCIUM SPEC-SCNC: 9.4 MG/DL (ref 8.6–10.5)
CHLORIDE SERPL-SCNC: 105 MMOL/L (ref 98–107)
CHOLEST SERPL-MCNC: 238 MG/DL (ref 0–200)
CO2 SERPL-SCNC: 24.2 MMOL/L (ref 22–29)
CREAT SERPL-MCNC: 0.8 MG/DL (ref 0.57–1)
DEPRECATED RDW RBC AUTO: 43.1 FL (ref 37–54)
EGFRCR SERPLBLD CKD-EPI 2021: 74.1 ML/MIN/1.73
ERYTHROCYTE [DISTWIDTH] IN BLOOD BY AUTOMATED COUNT: 12.4 % (ref 12.3–15.4)
GLOBULIN UR ELPH-MCNC: 2.7 GM/DL
GLUCOSE SERPL-MCNC: 91 MG/DL (ref 65–99)
HCT VFR BLD AUTO: 44.6 % (ref 34–46.6)
HDLC SERPL-MCNC: 105 MG/DL (ref 40–60)
HGB BLD-MCNC: 15.2 G/DL (ref 12–15.9)
LDLC SERPL CALC-MCNC: 120 MG/DL (ref 0–100)
LDLC/HDLC SERPL: 1.12 {RATIO}
MCH RBC QN AUTO: 32.3 PG (ref 26.6–33)
MCHC RBC AUTO-ENTMCNC: 34.1 G/DL (ref 31.5–35.7)
MCV RBC AUTO: 94.9 FL (ref 79–97)
PLATELET # BLD AUTO: 187 10*3/MM3 (ref 140–450)
PMV BLD AUTO: 10.1 FL (ref 6–12)
POTASSIUM SERPL-SCNC: 4.1 MMOL/L (ref 3.5–5.2)
PROT SERPL-MCNC: 7 G/DL (ref 6–8.5)
RBC # BLD AUTO: 4.7 10*6/MM3 (ref 3.77–5.28)
SODIUM SERPL-SCNC: 140 MMOL/L (ref 136–145)
TRIGL SERPL-MCNC: 75 MG/DL (ref 0–150)
TSH SERPL DL<=0.05 MIU/L-ACNC: 2.76 UIU/ML (ref 0.27–4.2)
VLDLC SERPL-MCNC: 13 MG/DL (ref 5–40)
WBC NRBC COR # BLD AUTO: 3.7 10*3/MM3 (ref 3.4–10.8)

## 2023-12-12 PROCEDURE — 84443 ASSAY THYROID STIM HORMONE: CPT | Performed by: INTERNAL MEDICINE

## 2023-12-12 PROCEDURE — 36415 COLL VENOUS BLD VENIPUNCTURE: CPT | Performed by: INTERNAL MEDICINE

## 2023-12-12 PROCEDURE — 85027 COMPLETE CBC AUTOMATED: CPT | Performed by: INTERNAL MEDICINE

## 2023-12-12 PROCEDURE — 80061 LIPID PANEL: CPT | Performed by: INTERNAL MEDICINE

## 2023-12-12 PROCEDURE — 80053 COMPREHEN METABOLIC PANEL: CPT | Performed by: INTERNAL MEDICINE

## 2024-02-05 DIAGNOSIS — Z78.0 POSTMENOPAUSAL: Primary | ICD-10-CM

## 2024-06-03 DIAGNOSIS — I10 BENIGN ESSENTIAL HYPERTENSION: Chronic | ICD-10-CM

## 2024-06-03 RX ORDER — LISINOPRIL 10 MG/1
10 TABLET ORAL DAILY
Qty: 90 TABLET | Refills: 3 | Status: SHIPPED | OUTPATIENT
Start: 2024-06-03

## 2024-06-03 NOTE — TELEPHONE ENCOUNTER
Rx Refill Note  Requested Prescriptions     Pending Prescriptions Disp Refills    lisinopril (PRINIVIL,ZESTRIL) 10 MG tablet [Pharmacy Med Name: LISINOPRIL TABS 10MG] 90 tablet 3     Sig: TAKE 1 TABLET DAILY      Last office visit with prescribing clinician: 12/6/2023   Last telemedicine visit with prescribing clinician: Visit date not found   Next office visit with prescribing clinician: 6/6/2024       Kerry Carbajal MA  06/03/24, 09:05 EDT

## 2024-06-06 ENCOUNTER — OFFICE VISIT (OUTPATIENT)
Dept: INTERNAL MEDICINE | Facility: CLINIC | Age: 84
End: 2024-06-06
Payer: MEDICARE

## 2024-06-06 ENCOUNTER — LAB (OUTPATIENT)
Dept: INTERNAL MEDICINE | Facility: CLINIC | Age: 84
End: 2024-06-06
Payer: MEDICARE

## 2024-06-06 VITALS
HEIGHT: 64 IN | HEART RATE: 82 BPM | RESPIRATION RATE: 12 BRPM | BODY MASS INDEX: 19.46 KG/M2 | DIASTOLIC BLOOD PRESSURE: 84 MMHG | SYSTOLIC BLOOD PRESSURE: 132 MMHG | OXYGEN SATURATION: 98 % | TEMPERATURE: 97.5 F | WEIGHT: 114 LBS

## 2024-06-06 DIAGNOSIS — M81.0 AGE-RELATED OSTEOPOROSIS WITHOUT CURRENT PATHOLOGICAL FRACTURE: Chronic | ICD-10-CM

## 2024-06-06 DIAGNOSIS — E55.9 VITAMIN D DEFICIENCY: ICD-10-CM

## 2024-06-06 DIAGNOSIS — I10 BENIGN ESSENTIAL HYPERTENSION: Primary | Chronic | ICD-10-CM

## 2024-06-06 DIAGNOSIS — E03.9 ACQUIRED HYPOTHYROIDISM: Chronic | ICD-10-CM

## 2024-06-06 DIAGNOSIS — F51.01 PRIMARY INSOMNIA: Chronic | ICD-10-CM

## 2024-06-06 DIAGNOSIS — I10 BENIGN ESSENTIAL HYPERTENSION: Chronic | ICD-10-CM

## 2024-06-06 LAB
25(OH)D3 SERPL-MCNC: 57.3 NG/ML (ref 30–100)
ALBUMIN SERPL-MCNC: 4.4 G/DL (ref 3.5–5.2)
ALBUMIN/GLOB SERPL: 1.5 G/DL
ALP SERPL-CCNC: 74 U/L (ref 39–117)
ALT SERPL W P-5'-P-CCNC: 13 U/L (ref 1–33)
ANION GAP SERPL CALCULATED.3IONS-SCNC: 7 MMOL/L (ref 5–15)
AST SERPL-CCNC: 20 U/L (ref 1–32)
BILIRUB SERPL-MCNC: 0.3 MG/DL (ref 0–1.2)
BUN SERPL-MCNC: 21 MG/DL (ref 8–23)
BUN/CREAT SERPL: 25.9 (ref 7–25)
CALCIUM SPEC-SCNC: 9.7 MG/DL (ref 8.6–10.5)
CHLORIDE SERPL-SCNC: 104 MMOL/L (ref 98–107)
CO2 SERPL-SCNC: 28 MMOL/L (ref 22–29)
CREAT SERPL-MCNC: 0.81 MG/DL (ref 0.57–1)
DEPRECATED RDW RBC AUTO: 41.9 FL (ref 37–54)
EGFRCR SERPLBLD CKD-EPI 2021: 72.6 ML/MIN/1.73
ERYTHROCYTE [DISTWIDTH] IN BLOOD BY AUTOMATED COUNT: 12 % (ref 12.3–15.4)
GLOBULIN UR ELPH-MCNC: 2.9 GM/DL
GLUCOSE SERPL-MCNC: 87 MG/DL (ref 65–99)
HCT VFR BLD AUTO: 45.2 % (ref 34–46.6)
HGB BLD-MCNC: 15.3 G/DL (ref 12–15.9)
MCH RBC QN AUTO: 32.2 PG (ref 26.6–33)
MCHC RBC AUTO-ENTMCNC: 33.8 G/DL (ref 31.5–35.7)
MCV RBC AUTO: 95.2 FL (ref 79–97)
PLATELET # BLD AUTO: 204 10*3/MM3 (ref 140–450)
PMV BLD AUTO: 10.3 FL (ref 6–12)
POTASSIUM SERPL-SCNC: 4.6 MMOL/L (ref 3.5–5.2)
PROT SERPL-MCNC: 7.3 G/DL (ref 6–8.5)
RBC # BLD AUTO: 4.75 10*6/MM3 (ref 3.77–5.28)
SODIUM SERPL-SCNC: 139 MMOL/L (ref 136–145)
TSH SERPL DL<=0.05 MIU/L-ACNC: 1.82 UIU/ML (ref 0.27–4.2)
WBC NRBC COR # BLD AUTO: 3.9 10*3/MM3 (ref 3.4–10.8)

## 2024-06-06 PROCEDURE — 1126F AMNT PAIN NOTED NONE PRSNT: CPT | Performed by: INTERNAL MEDICINE

## 2024-06-06 PROCEDURE — 80053 COMPREHEN METABOLIC PANEL: CPT | Performed by: INTERNAL MEDICINE

## 2024-06-06 PROCEDURE — 3075F SYST BP GE 130 - 139MM HG: CPT | Performed by: INTERNAL MEDICINE

## 2024-06-06 PROCEDURE — 82306 VITAMIN D 25 HYDROXY: CPT | Performed by: INTERNAL MEDICINE

## 2024-06-06 PROCEDURE — 99214 OFFICE O/P EST MOD 30 MIN: CPT | Performed by: INTERNAL MEDICINE

## 2024-06-06 PROCEDURE — 3079F DIAST BP 80-89 MM HG: CPT | Performed by: INTERNAL MEDICINE

## 2024-06-06 PROCEDURE — 84443 ASSAY THYROID STIM HORMONE: CPT | Performed by: INTERNAL MEDICINE

## 2024-06-06 PROCEDURE — 36415 COLL VENOUS BLD VENIPUNCTURE: CPT | Performed by: INTERNAL MEDICINE

## 2024-06-06 PROCEDURE — 85027 COMPLETE CBC AUTOMATED: CPT | Performed by: INTERNAL MEDICINE

## 2024-06-06 PROCEDURE — 1160F RVW MEDS BY RX/DR IN RCRD: CPT | Performed by: INTERNAL MEDICINE

## 2024-06-06 PROCEDURE — G2211 COMPLEX E/M VISIT ADD ON: HCPCS | Performed by: INTERNAL MEDICINE

## 2024-06-06 PROCEDURE — 1159F MED LIST DOCD IN RCRD: CPT | Performed by: INTERNAL MEDICINE

## 2024-06-06 RX ORDER — DIAZEPAM 5 MG/1
5 TABLET ORAL NIGHTLY PRN
Qty: 30 TABLET | Refills: 2 | Status: SHIPPED | OUTPATIENT
Start: 2024-06-06

## 2024-06-06 RX ORDER — LEVOTHYROXINE SODIUM 0.07 MG/1
75 TABLET ORAL DAILY
Qty: 90 TABLET | Refills: 3 | Status: CANCELLED | OUTPATIENT
Start: 2024-06-06

## 2024-06-06 NOTE — PROGRESS NOTES
Answers submitted by the patient for this visit:  Other (Submitted on 6/6/2024)  Please describe your symptoms.: need a refill prescription for Levothyroxine  Have you had these symptoms before?: Yes  How long have you been having these symptoms?: 1-4 days  Please list any medications you are currently taking for this condition.: Levothyroxine  Primary Reason for Visit (Submitted on 6/6/2024)  What is the primary reason for your visit?: Other  Chief Complaint  Anxiety    Subjective          Nciole Ruiz presents to [facility]  History of Present Illness    Hypertension-she is on lisinopril. She does check about every 2 weeks and generally has been well-controlled. Her borhter passed away unexpectedly last week so has been difficult     Hypothyroid-she is on Synthroid . Wt is stable, energy level good     Anxiety-will  use Valium occasionally- maybe 2-3x/week, usually 1/2 tablet at a time and does help with anxiety and sleep.  She feels like she is managing okay so would not want to try another SSRI-had nausea when we tried Zoloft last year     Osteoporosis-she did get Reclast in '19,  but had achiness for about 6 months so did not do in '20.We did restart evista in 4/22 but she had more and cramps with it so has stopped. She does take calcium/D and her vitamin D level looked good on previous checks.  She does walk regularly      Current Outpatient Medications:     diazePAM (VALIUM) 5 MG tablet, Take 1 tablet by mouth At Night As Needed for Anxiety., Disp: 30 tablet, Rfl: 2    levothyroxine (SYNTHROID, LEVOTHROID) 75 MCG tablet, TAKE 1 TABLET DAILY, Disp: 90 tablet, Rfl: 3    lisinopril (PRINIVIL,ZESTRIL) 10 MG tablet, TAKE 1 TABLET DAILY, Disp: 90 tablet, Rfl: 3   The following portions of the patient's history were reviewed and updated as appropriate: allergies, current medications, past family history, past medical history, past social history, past surgical history and problem  "list.     Objective   Vital Signs:   /84   Pulse 82   Temp 97.5 °F (36.4 °C) (Infrared)   Resp 12   Ht 161.7 cm (63.66\")   Wt 51.7 kg (114 lb)   SpO2 98%   BMI 19.78 kg/m²       Physical exam  Constitutional: oriented to person, place, and time.  well-developed and well-nourished. No distress.   HENT:   Head: Normocephalic and atraumatic.   Eyes: Conjunctivae and EOM are normal.   Cardiovascular: Normal rate, regular rhythm and normal heart sounds.  Exam reveals no gallop and no friction rub.    No murmur heard.  Pulmonary/Chest: Effort normal and breath sounds normal. No respiratory distress.   no wheezes.   Neurological:  alert and oriented to person, place, and time.   Skin: Skin is warm and dry. not diaphoretic.   Psychiatric:  normal mood and affect. behavior is normal. Judgment and thought content normal.      Result Review :   The following data was reviewed by: Cherelle Tee MD on 06/06/2024:  CMP          12/12/2023    09:16   CMP   Glucose 91    BUN 17    Creatinine 0.80    EGFR 74.1    Sodium 140    Potassium 4.1    Chloride 105    Calcium 9.4    Total Protein 7.0    Albumin 4.3    Globulin 2.7    Total Bilirubin 0.6    Alkaline Phosphatase 64    AST (SGOT) 25    ALT (SGPT) 11    Albumin/Globulin Ratio 1.6    BUN/Creatinine Ratio 21.3    Anion Gap 10.8      CBC          12/12/2023    09:16   CBC   WBC 3.70    RBC 4.70    Hemoglobin 15.2    Hematocrit 44.6    MCV 94.9    MCH 32.3    MCHC 34.1    RDW 12.4    Platelets 187      Lipid Panel          12/12/2023    09:16   Lipid Panel   Total Cholesterol 238    Triglycerides 75    HDL Cholesterol 105    VLDL Cholesterol 13    LDL Cholesterol  120    LDL/HDL Ratio 1.12      TSH          12/12/2023    09:16   TSH   TSH 2.760      Lab Results   Component Value Date    NRCV82QR 49.0 12/06/2022               Assessment and Plan    Diagnoses and all orders for this visit:    1. Benign essential hypertension (Primary)-good control  -     Comprehensive " Metabolic Panel; Future  -     CBC (No Diff); Future    2. Acquired hypothyroidism-check lab  -     TSH; Future    3. Age-related osteoporosis without current pathological fracture-DEXA is due this year but patient had difficulty with the different medications we have tried for osteoporosis so she prefers to wait on the bone density    4. Vitamin D deficiency-recheck daily  -     Vitamin D,25-Hydroxy; Future    5. Primary insomnia  Comments:  Stable on as needed Valium.  She has signed Michael FERNANDEZ appropriate.  Refill today  Orders:  -     diazePAM (VALIUM) 5 MG tablet; Take 1 tablet by mouth At Night As Needed for Anxiety.  Dispense: 30 tablet; Refill: 2        Follow Up   Return in about 6 months (around 12/6/2024) for Medicare Wellness.  Patient was given instructions and counseling regarding her condition or for health maintenance advice. Please see specific information pulled into the AVS if appropriate.

## 2024-08-28 ENCOUNTER — TELEPHONE (OUTPATIENT)
Dept: INTERNAL MEDICINE | Facility: CLINIC | Age: 84
End: 2024-08-28
Payer: MEDICARE

## 2024-08-28 NOTE — TELEPHONE ENCOUNTER
Caller: Nicole Ruiz    Relationship to patient: Self    Best call back number: [phone]     Date of positive COVID19 test: 08/28/24    Date of possible COVID19 exposure: NOT SURE    COVID19 symptoms: HEADACHE, SINUS ISSUES    Date of initial quarantine: 08/27/24    Additional information or concerns: PATIENT WOULD LIKE TO KNOW WHAT SHE SHOULD DO?    What is the patients preferred pharmacy: [facility]

## 2024-08-28 NOTE — TELEPHONE ENCOUNTER
Sx started on 8/26/24 and then tested positive for covid today.  She has trouble with the sinus pressure.  Patient would like to try paxlovid.

## 2024-08-28 NOTE — TELEPHONE ENCOUNTER
I sent in the Paxlovid.  Would hold the diazepam/Valium while she is on this as Paxlovid can increase the levels of it

## 2024-10-14 DIAGNOSIS — E03.9 ACQUIRED HYPOTHYROIDISM: ICD-10-CM

## 2024-10-14 RX ORDER — LEVOTHYROXINE SODIUM 75 UG/1
TABLET ORAL
Qty: 90 TABLET | Refills: 0 | Status: SHIPPED | OUTPATIENT
Start: 2024-10-14

## 2024-10-14 NOTE — TELEPHONE ENCOUNTER
Rx Refill Note  Requested Prescriptions     Pending Prescriptions Disp Refills    levothyroxine (SYNTHROID, LEVOTHROID) 75 MCG tablet [Pharmacy Med Name: L-THYROXINE (SYNTHROID) TABS 75MCG] 90 tablet 0     Sig: TAKE 1 TABLET DAILY      Last office visit with prescribing clinician: 6/6/2024     Next office visit with prescribing clinician: 12/12/2024       Mercedes Dejesus  10/14/24, 07:52 EDT

## 2024-12-12 ENCOUNTER — OFFICE VISIT (OUTPATIENT)
Dept: INTERNAL MEDICINE | Facility: CLINIC | Age: 84
End: 2024-12-12
Payer: MEDICARE

## 2024-12-12 VITALS
RESPIRATION RATE: 16 BRPM | HEIGHT: 64 IN | HEART RATE: 68 BPM | SYSTOLIC BLOOD PRESSURE: 118 MMHG | BODY MASS INDEX: 19.12 KG/M2 | WEIGHT: 112 LBS | TEMPERATURE: 97.5 F | OXYGEN SATURATION: 95 % | DIASTOLIC BLOOD PRESSURE: 78 MMHG

## 2024-12-12 DIAGNOSIS — Z00.00 MEDICARE ANNUAL WELLNESS VISIT, SUBSEQUENT: Primary | ICD-10-CM

## 2024-12-12 DIAGNOSIS — R31.29 OTHER MICROSCOPIC HEMATURIA: ICD-10-CM

## 2024-12-12 DIAGNOSIS — Z78.0 POSTMENOPAUSAL: ICD-10-CM

## 2024-12-12 DIAGNOSIS — I10 BENIGN ESSENTIAL HYPERTENSION: Chronic | ICD-10-CM

## 2024-12-12 DIAGNOSIS — Z00.00 ROUTINE GENERAL MEDICAL EXAMINATION AT A HEALTH CARE FACILITY: ICD-10-CM

## 2024-12-12 DIAGNOSIS — F51.01 PRIMARY INSOMNIA: Chronic | ICD-10-CM

## 2024-12-12 DIAGNOSIS — F41.9 ANXIETY: Chronic | ICD-10-CM

## 2024-12-12 DIAGNOSIS — E03.9 ACQUIRED HYPOTHYROIDISM: Chronic | ICD-10-CM

## 2024-12-12 LAB
BILIRUB BLD-MCNC: NEGATIVE MG/DL
BILIRUB UR QL STRIP: NEGATIVE
CLARITY UR: CLEAR
CLARITY, POC: CLEAR
COLOR UR: YELLOW
COLOR UR: YELLOW
EXPIRATION DATE: ABNORMAL
GLUCOSE UR STRIP-MCNC: NEGATIVE MG/DL
GLUCOSE UR STRIP-MCNC: NEGATIVE MG/DL
HGB UR QL STRIP.AUTO: NEGATIVE
KETONES UR QL STRIP: NEGATIVE
KETONES UR QL: NEGATIVE
LEUKOCYTE EST, POC: NEGATIVE
LEUKOCYTE ESTERASE UR QL STRIP.AUTO: ABNORMAL
Lab: ABNORMAL
NITRITE UR QL STRIP: NEGATIVE
NITRITE UR-MCNC: NEGATIVE MG/ML
PH UR STRIP.AUTO: 6.5 [PH] (ref 5–8)
PH UR: 6 [PH] (ref 5–8)
PROT UR QL STRIP: NEGATIVE
PROT UR STRIP-MCNC: NEGATIVE MG/DL
RBC # UR STRIP: ABNORMAL /UL
SP GR UR STRIP: 1.02 (ref 1–1.03)
SP GR UR: 1.02 (ref 1–1.03)
UROBILINOGEN UR QL STRIP: ABNORMAL
UROBILINOGEN UR QL: NORMAL

## 2024-12-12 PROCEDURE — 1126F AMNT PAIN NOTED NONE PRSNT: CPT | Performed by: INTERNAL MEDICINE

## 2024-12-12 PROCEDURE — 1159F MED LIST DOCD IN RCRD: CPT | Performed by: INTERNAL MEDICINE

## 2024-12-12 PROCEDURE — 1170F FXNL STATUS ASSESSED: CPT | Performed by: INTERNAL MEDICINE

## 2024-12-12 PROCEDURE — 3074F SYST BP LT 130 MM HG: CPT | Performed by: INTERNAL MEDICINE

## 2024-12-12 PROCEDURE — 1160F RVW MEDS BY RX/DR IN RCRD: CPT | Performed by: INTERNAL MEDICINE

## 2024-12-12 PROCEDURE — 3078F DIAST BP <80 MM HG: CPT | Performed by: INTERNAL MEDICINE

## 2024-12-12 PROCEDURE — G0439 PPPS, SUBSEQ VISIT: HCPCS | Performed by: INTERNAL MEDICINE

## 2024-12-12 PROCEDURE — 81001 URINALYSIS AUTO W/SCOPE: CPT | Performed by: INTERNAL MEDICINE

## 2024-12-12 PROCEDURE — 81003 URINALYSIS AUTO W/O SCOPE: CPT | Performed by: INTERNAL MEDICINE

## 2024-12-12 PROCEDURE — 87086 URINE CULTURE/COLONY COUNT: CPT | Performed by: INTERNAL MEDICINE

## 2024-12-12 RX ORDER — DIAZEPAM 5 MG/1
5 TABLET ORAL NIGHTLY PRN
Qty: 30 TABLET | Refills: 2 | Status: SHIPPED | OUTPATIENT
Start: 2024-12-12

## 2024-12-12 NOTE — ASSESSMENT & PLAN NOTE
Good control    Orders:    CBC (No Diff); Future    TSH Rfx On Abnormal To Free T4; Future    Lipid Panel; Future    Comprehensive Metabolic Panel; Future

## 2024-12-12 NOTE — ASSESSMENT & PLAN NOTE
Check levels today.  Patient has had some weight loss  Orders:    CBC (No Diff); Future    TSH Rfx On Abnormal To Free T4; Future    Lipid Panel; Future    Comprehensive Metabolic Panel; Future

## 2024-12-12 NOTE — ASSESSMENT & PLAN NOTE
Patient uses Valium as needed.  She has signed controlled substance contract.  Michael appropriate.  She still has some but we will go ahead and send in a refill while she is here today

## 2024-12-12 NOTE — PROGRESS NOTES
Subjective   The ABCs of the Annual Wellness Visit  Medicare Wellness Visit      Nicole Ruiz is a 84 y.o. patient who presents for a Medicare Wellness Visit.    The following portions of the patient's history were reviewed and   updated as appropriate: allergies, current medications, past family history, past medical history, past social history, past surgical history, and problem list.    Compared to one year ago, the patient's physical   health is the same.  Compared to one year ago, the patient's mental   health is the same.    Recent Hospitalizations:  She was not admitted to the hospital during the last year.     Current Medical Providers:  Patient Care Team:  Cherelle Tee MD as PCP - General (Internal Medicine)  Kiko Mccartney MD as Consulting Physician (Gastroenterology)  Samm Perez MD as Consulting Physician (Ophthalmology)  Ajit Kenny DMD (Dental General Practice)  Samm Hassan MD (Orthopedic Surgery)    Outpatient Medications Prior to Visit   Medication Sig Dispense Refill    CALCIUM PO Take  by mouth.      diazePAM (VALIUM) 5 MG tablet Take 1 tablet by mouth At Night As Needed for Anxiety. 30 tablet 2    levothyroxine (SYNTHROID, LEVOTHROID) 75 MCG tablet TAKE 1 TABLET DAILY 90 tablet 0    lisinopril (PRINIVIL,ZESTRIL) 10 MG tablet TAKE 1 TABLET DAILY 90 tablet 3    VITAMIN D PO Take  by mouth.      Nirmatrelvir & Ritonavir, 300mg/100mg, (PAXLOVID) Take 3 tablets by mouth 2 (Two) Times a Day. Hold valium while on 30 each 0     No facility-administered medications prior to visit.     No opioid medication identified on active medication list. I have reviewed chart for other potential  high risk medication/s and harmful drug interactions in the elderly.      Aspirin is not on active medication list.  Aspirin use is not indicated based on review of current medical condition/s. Risk of harm outweighs potential benefits.  .    Patient Active Problem List   Diagnosis    Benign  "essential hypertension    Age-related osteoporosis without current pathological fracture    Acquired hypothyroidism    Migraine without status migrainosus    Vitiligo    Localized osteoarthrosis, lower leg    Anxiety    Chronic pain of right knee    Age-related cataract of right eye    Muscle spasm    Acute right-sided thoracic back pain     Advance Care Planning Advance Directive is on file.  ACP discussion was held with the patient during this visit. pt does plan to update her living will to change power of  from her  to her son as her  does have some memory loss.  She will bring copy and when completed            Objective   Vitals:    24 1305   BP: 118/78   BP Location: Right arm   Patient Position: Sitting   Cuff Size: Adult   Pulse: 68   Resp: 16   Temp: 97.5 °F (36.4 °C)   TempSrc: Infrared   SpO2: 95%   Weight: 50.8 kg (112 lb)   Height: 161.7 cm (63.66\")       Estimated body mass index is 19.43 kg/m² as calculated from the following:    Height as of this encounter: 161.7 cm (63.66\").    Weight as of this encounter: 50.8 kg (112 lb).    BMI is within normal parameters. No other follow-up for BMI required.       Does the patient have evidence of cognitive impairment? No                                                                                                Health  Risk Assessment    Smoking Status:  Social History     Tobacco Use   Smoking Status Never   Smokeless Tobacco Never     Alcohol Consumption:  Social History     Substance and Sexual Activity   Alcohol Use Not Currently    Comment:  2/year       Fall Risk Screen  STEADI Fall Risk Assessment was completed, and patient is at LOW risk for falls.Assessment completed on:2024    Depression Screening   Little interest or pleasure in doing things? Not at all   Feeling down, depressed, or hopeless? Not at all   PHQ-2 Total Score 0      Health Habits and Functional and Cognitive Screenin/12/2024    12:58 PM "   Functional & Cognitive Status   Do you have difficulty preparing food and eating? No   Do you have difficulty bathing yourself, getting dressed or grooming yourself? No   Do you have difficulty using the toilet? No   Do you have difficulty moving around from place to place? No   Do you have trouble with steps or getting out of a bed or a chair? No   Current Diet Well Balanced Diet   Dental Exam Up to date   Eye Exam Up to date   Exercise (times per week) 2 times per week   Current Exercises Include Walking;No Regular Exercise   Do you need help using the phone?  No   Are you deaf or do you have serious difficulty hearing?  No   Do you need help to go to places out of walking distance? No   Do you need help shopping? No   Do you need help preparing meals?  No   Do you need help with housework?  No   Do you need help with laundry? No   Do you need help taking your medications? No   Do you need help managing money? No   Do you ever drive or ride in a car without wearing a seat belt? No   Have you felt unusual stress, anger or loneliness in the last month? No   Who do you live with? Spouse   If you need help, do you have trouble finding someone available to you? No   Have you been bothered in the last four weeks by sexual problems? No   Do you have difficulty concentrating, remembering or making decisions? No           Age-appropriate Screening Schedule:  Refer to the list below for future screening recommendations based on patient's age, sex and/or medical conditions. Orders for these recommended tests are listed in the plan section. The patient has been provided with a written plan.    Health Maintenance List  Health Maintenance   Topic Date Due    RSV Vaccine - Adults (1 - 1-dose 75+ series) Never done    DXA SCAN  04/04/2024    COVID-19 Vaccine (7 - 2024-25 season) 09/01/2024    ANNUAL WELLNESS VISIT  12/06/2024    COLORECTAL CANCER SCREENING  02/03/2025    TDAP/TD VACCINES (3 - Td or Tdap) 01/25/2034    INFLUENZA  VACCINE  Completed    Pneumococcal Vaccine 65+  Completed    ZOSTER VACCINE  Completed    MAMMOGRAM  Discontinued                                                                                                                                                CMS Preventative Services Quick Reference  Risk Factors Identified During Encounter  None Identified    The above risks/problems have been discussed with the patient.  Pertinent information has been shared with the patient in the After Visit Summary.  An After Visit Summary and PPPS were made available to the patient.    Follow Up:   Next Medicare Wellness visit to be scheduled in 1 year.         Additional E&M Note during same encounter follows:  Patient has additional, significant, and separately identifiable condition(s)/problem(s) that require work above and beyond the Medicare Wellness Visit     Chief Complaint  Medicare Wellness-subsequent    Subjective   HPI  iNcole is also being seen today for additional medical problem/s.    Review of Systems   Constitutional:  Negative for activity change, appetite change, fatigue and fever.   Respiratory:  Negative for shortness of breath.    Cardiovascular:  Negative for chest pain and leg swelling.   Gastrointestinal: Negative.    Genitourinary:  Negative for dysuria.   Musculoskeletal: Negative.    Allergic/Immunologic: Negative for immunocompromised state.   Neurological:  Negative for seizures, syncope, speech difficulty, weakness and confusion.   Psychiatric/Behavioral:  Negative for decreased concentration and dysphoric mood.         Stress        diet -healthy overall. She is on ca/d3 as well as a joint 3 select (glucosamine/hyaluronic)  exericse -stays very active though no formal exercise right now    Hypertension-she is on lisinopril. She does check regularly and has been well-controlled     Hypothyroid-she is on Synthroid .  Weight is down a few pounds.  She feels like her appetite is decent but is under  "stress with her 's memory loss.  No abdominal pain.  Bowel movements regular.  Energy level good.  Weight is down 7 pounds in the last 3 years     Osteoporosis-she did get Reclast in '19,  but had achiness for about 6 months so did not do in '20.We did restart evista in 4/22 but she had more and cramps with it so has stopped. She does take calcium/D and her vitamin D level looked good this year.    Was on forteo as well 20 years ago and tolerated     Cloudy urine in am, no dysuria    Patient had Quantaflow test done through the [facility] home visit recently and bilateral legs were borderline/normal PVD.  She has no leg pain.  She was never a smoker   (1.2 in the right and 1.22 in the left)    \    Objective   Vital Signs:  /78 (BP Location: Right arm, Patient Position: Sitting, Cuff Size: Adult)   Pulse 68   Temp 97.5 °F (36.4 °C) (Infrared)   Resp 16   Ht 161.7 cm (63.66\")   Wt 50.8 kg (112 lb)   SpO2 95%   BMI 19.43 kg/m²   Physical Exam  Vitals and nursing note reviewed.   Constitutional:       General: She is not in acute distress.     Appearance: Normal appearance. She is well-developed. She is not diaphoretic.   HENT:      Head: Normocephalic and atraumatic.      Right Ear: External ear normal.      Left Ear: External ear normal.      Nose: Nose normal.      Mouth/Throat:      Pharynx: No oropharyngeal exudate.   Eyes:      General: No scleral icterus.        Right eye: No discharge.         Left eye: No discharge.      Conjunctiva/sclera: Conjunctivae normal.      Pupils: Pupils are equal, round, and reactive to light.   Neck:      Thyroid: No thyromegaly.   Cardiovascular:      Rate and Rhythm: Normal rate and regular rhythm.      Pulses: Normal pulses.      Heart sounds: Normal heart sounds. No murmur heard.     No friction rub. No gallop.   Pulmonary:      Effort: Pulmonary effort is normal. No respiratory distress.      Breath sounds: Normal breath sounds. No wheezing or rales. "   Chest:   Breasts:     Right: No mass, nipple discharge, skin change or tenderness.      Left: No mass, nipple discharge, skin change or tenderness.   Abdominal:      General: Bowel sounds are normal. There is no distension.      Palpations: Abdomen is soft. There is no mass.      Tenderness: There is no abdominal tenderness. There is no guarding or rebound.   Musculoskeletal:         General: No deformity. Normal range of motion.      Cervical back: Normal range of motion and neck supple.      Comments: Pedal Pulses easily palpable bilaterally and no calf tenderness   Lymphadenopathy:      Cervical: No cervical adenopathy.   Skin:     General: Skin is warm and dry.      Coloration: Skin is not pale.      Findings: No erythema or rash.   Neurological:      Mental Status: She is alert and oriented to person, place, and time.      Coordination: Coordination normal.      Deep Tendon Reflexes: Reflexes normal.   Psychiatric:         Behavior: Behavior normal.         Thought Content: Thought content normal.         Judgment: Judgment normal.                       Assessment and Plan            Medicare annual wellness visit, subsequent  Living will -pt has and is on file.  She is probably going to update and will bring updated version when available  Addie -due   Colon done in '15, due in '25- probably won't do any more since she is 84  Pneumovax - had in '20  prevnar- had  covid -patient had this fall  Flu shot -patient had  RSV - she will get  DT due '34  DEXA due4/24 (she had one dose of reclast)- we will order.  She is not sure she would want to do any more osteoporosis medication but consider Forteo as she was able to tolerate this last  Shingles - she had shingrix  Does not need paps since age 65 or older and has had normal paps in past  We discussed the abnormal PVD screening but patient asymptomatic and test results were borderline low so we will monitor for now.  No risk factors for PVD  We will monitor her  weight-most likely is due to chronic stress but we will make sure blood work looks good and investigate further if weight goes down further    Orders:    POCT urinalysis dipstick, automated    Routine general medical examination at a health care facility    Orders:    CBC (No Diff); Future    TSH Rfx On Abnormal To Free T4; Future    Lipid Panel; Future    Comprehensive Metabolic Panel; Future    Anxiety  Patient uses Valium as needed.  She has signed controlled substance contract.  Michael appropriate.  She still has some but we will go ahead and send in a refill while she is here today       Acquired hypothyroidism  Check levels today.  Patient has had some weight loss  Orders:    CBC (No Diff); Future    TSH Rfx On Abnormal To Free T4; Future    Lipid Panel; Future    Comprehensive Metabolic Panel; Future    Benign essential hypertension  Good control    Orders:    CBC (No Diff); Future    TSH Rfx On Abnormal To Free T4; Future    Lipid Panel; Future    Comprehensive Metabolic Panel; Future    Other microscopic hematuria    Orders:    Urine Culture - , Urine, Clean Catch; Future    Urinalysis With Microscopic - Urine, Clean Catch; Future    Urine Culture - Urine, Urine, Clean Catch    Urinalysis With Microscopic - Urine, Clean Catch    Primary insomnia    Orders:    diazePAM (VALIUM) 5 MG tablet; Take 1 tablet by mouth At Night As Needed for Anxiety.    Postmenopausal    Orders:    DEXA Bone Density Axial; Future            Follow Up   No follow-ups on file.  Patient was given instructions and counseling regarding her condition or for health maintenance advice. Please see specific information pulled into the AVS if appropriate.

## 2024-12-13 ENCOUNTER — LAB (OUTPATIENT)
Dept: INTERNAL MEDICINE | Facility: CLINIC | Age: 84
End: 2024-12-13
Payer: MEDICARE

## 2024-12-13 DIAGNOSIS — E03.9 ACQUIRED HYPOTHYROIDISM: Chronic | ICD-10-CM

## 2024-12-13 DIAGNOSIS — I10 BENIGN ESSENTIAL HYPERTENSION: Chronic | ICD-10-CM

## 2024-12-13 DIAGNOSIS — Z00.00 ROUTINE GENERAL MEDICAL EXAMINATION AT A HEALTH CARE FACILITY: ICD-10-CM

## 2024-12-13 LAB
ALBUMIN SERPL-MCNC: 4.1 G/DL (ref 3.5–5.2)
ALBUMIN/GLOB SERPL: 1.4 G/DL
ALP SERPL-CCNC: 71 U/L (ref 39–117)
ALT SERPL W P-5'-P-CCNC: 12 U/L (ref 1–33)
ANION GAP SERPL CALCULATED.3IONS-SCNC: 12.9 MMOL/L (ref 5–15)
AST SERPL-CCNC: 27 U/L (ref 1–32)
BACTERIA UR QL AUTO: ABNORMAL /HPF
BILIRUB SERPL-MCNC: 0.3 MG/DL (ref 0–1.2)
BUN SERPL-MCNC: 21 MG/DL (ref 8–23)
BUN/CREAT SERPL: 21.2 (ref 7–25)
CALCIUM SPEC-SCNC: 9.7 MG/DL (ref 8.6–10.5)
CHLORIDE SERPL-SCNC: 107 MMOL/L (ref 98–107)
CHOLEST SERPL-MCNC: 222 MG/DL (ref 0–200)
CO2 SERPL-SCNC: 25.1 MMOL/L (ref 22–29)
COD CRY URNS QL: PRESENT /HPF
CREAT SERPL-MCNC: 0.99 MG/DL (ref 0.57–1)
DEPRECATED RDW RBC AUTO: 43 FL (ref 37–54)
EGFRCR SERPLBLD CKD-EPI 2021: 57 ML/MIN/1.73
ERYTHROCYTE [DISTWIDTH] IN BLOOD BY AUTOMATED COUNT: 12.2 % (ref 12.3–15.4)
GLOBULIN UR ELPH-MCNC: 3 GM/DL
GLUCOSE SERPL-MCNC: 87 MG/DL (ref 65–99)
HCT VFR BLD AUTO: 44.2 % (ref 34–46.6)
HDLC SERPL-MCNC: 111 MG/DL (ref 40–60)
HGB BLD-MCNC: 15.2 G/DL (ref 12–15.9)
HYALINE CASTS UR QL AUTO: ABNORMAL /LPF
LDLC SERPL CALC-MCNC: 101 MG/DL (ref 0–100)
LDLC/HDLC SERPL: 0.9 {RATIO}
MCH RBC QN AUTO: 33.3 PG (ref 26.6–33)
MCHC RBC AUTO-ENTMCNC: 34.4 G/DL (ref 31.5–35.7)
MCV RBC AUTO: 96.7 FL (ref 79–97)
PLATELET # BLD AUTO: 168 10*3/MM3 (ref 140–450)
PMV BLD AUTO: 10.2 FL (ref 6–12)
POTASSIUM SERPL-SCNC: 4.7 MMOL/L (ref 3.5–5.2)
PROT SERPL-MCNC: 7.1 G/DL (ref 6–8.5)
RBC # BLD AUTO: 4.57 10*6/MM3 (ref 3.77–5.28)
RBC # UR STRIP: ABNORMAL /HPF
REF LAB TEST METHOD: ABNORMAL
SODIUM SERPL-SCNC: 145 MMOL/L (ref 136–145)
SQUAMOUS #/AREA URNS HPF: ABNORMAL /HPF
TRIGL SERPL-MCNC: 56 MG/DL (ref 0–150)
TSH SERPL DL<=0.05 MIU/L-ACNC: 1.66 UIU/ML (ref 0.27–4.2)
VLDLC SERPL-MCNC: 10 MG/DL (ref 5–40)
WBC # UR STRIP: ABNORMAL /HPF
WBC NRBC COR # BLD AUTO: 3.07 10*3/MM3 (ref 3.4–10.8)

## 2024-12-13 PROCEDURE — 84443 ASSAY THYROID STIM HORMONE: CPT | Performed by: INTERNAL MEDICINE

## 2024-12-13 PROCEDURE — 80053 COMPREHEN METABOLIC PANEL: CPT | Performed by: INTERNAL MEDICINE

## 2024-12-13 PROCEDURE — 85027 COMPLETE CBC AUTOMATED: CPT | Performed by: INTERNAL MEDICINE

## 2024-12-13 PROCEDURE — 80061 LIPID PANEL: CPT | Performed by: INTERNAL MEDICINE

## 2024-12-13 PROCEDURE — 36415 COLL VENOUS BLD VENIPUNCTURE: CPT | Performed by: INTERNAL MEDICINE

## 2024-12-14 LAB — BACTERIA SPEC AEROBE CULT: NO GROWTH

## 2024-12-15 DIAGNOSIS — E03.9 ACQUIRED HYPOTHYROIDISM: ICD-10-CM

## 2024-12-15 RX ORDER — LEVOTHYROXINE SODIUM 75 UG/1
75 TABLET ORAL DAILY
Qty: 90 TABLET | Refills: 3 | Status: SHIPPED | OUTPATIENT
Start: 2024-12-15

## 2025-02-13 ENCOUNTER — PRIOR AUTHORIZATION (OUTPATIENT)
Dept: INTERNAL MEDICINE | Facility: CLINIC | Age: 85
End: 2025-02-13
Payer: MEDICARE

## 2025-02-18 NOTE — TELEPHONE ENCOUNTER
Received determination from insurance company.     Status:Approved;  Review Type:Prior Auth;  Coverage Start Date:01/14/2025;  Coverage End Date:02/14/2026;      [facility]

## 2025-03-20 ENCOUNTER — HOSPITAL ENCOUNTER (OUTPATIENT)
Dept: BONE DENSITY | Facility: HOSPITAL | Age: 85
Discharge: HOME | End: 2025-03-20
Admitting: INTERNAL MEDICINE
Payer: MEDICARE

## 2025-03-20 DIAGNOSIS — Z78.0 POSTMENOPAUSAL: ICD-10-CM

## 2025-03-20 PROCEDURE — 77080 DXA BONE DENSITY AXIAL: CPT

## 2025-05-29 DIAGNOSIS — I10 BENIGN ESSENTIAL HYPERTENSION: Chronic | ICD-10-CM

## 2025-05-29 RX ORDER — LISINOPRIL 10 MG/1
10 TABLET ORAL DAILY
Qty: 90 TABLET | Refills: 0 | Status: SHIPPED | OUTPATIENT
Start: 2025-05-29

## 2025-06-12 ENCOUNTER — OFFICE VISIT (OUTPATIENT)
Dept: INTERNAL MEDICINE | Facility: CLINIC | Age: 85
End: 2025-06-12
Payer: MEDICARE

## 2025-06-12 ENCOUNTER — HOSPITAL ENCOUNTER (OUTPATIENT)
Dept: GENERAL RADIOLOGY | Facility: HOSPITAL | Age: 85
Discharge: HOME | End: 2025-06-12
Admitting: INTERNAL MEDICINE
Payer: MEDICARE

## 2025-06-12 ENCOUNTER — LAB (OUTPATIENT)
Dept: INTERNAL MEDICINE | Facility: CLINIC | Age: 85
End: 2025-06-12
Payer: MEDICARE

## 2025-06-12 VITALS
SYSTOLIC BLOOD PRESSURE: 150 MMHG | RESPIRATION RATE: 14 BRPM | HEIGHT: 64 IN | OXYGEN SATURATION: 96 % | WEIGHT: 108 LBS | HEART RATE: 56 BPM | BODY MASS INDEX: 18.44 KG/M2 | TEMPERATURE: 96.9 F | DIASTOLIC BLOOD PRESSURE: 92 MMHG

## 2025-06-12 DIAGNOSIS — I10 BENIGN ESSENTIAL HYPERTENSION: Chronic | ICD-10-CM

## 2025-06-12 DIAGNOSIS — F41.9 ANXIETY: Chronic | ICD-10-CM

## 2025-06-12 DIAGNOSIS — M25.552 LEFT HIP PAIN: ICD-10-CM

## 2025-06-12 DIAGNOSIS — E03.9 ACQUIRED HYPOTHYROIDISM: Chronic | ICD-10-CM

## 2025-06-12 DIAGNOSIS — M81.0 AGE-RELATED OSTEOPOROSIS WITHOUT CURRENT PATHOLOGICAL FRACTURE: Primary | Chronic | ICD-10-CM

## 2025-06-12 LAB
ALBUMIN SERPL-MCNC: 4.4 G/DL (ref 3.5–5.2)
ALBUMIN/GLOB SERPL: 1.3 G/DL
ALP SERPL-CCNC: 81 U/L (ref 39–117)
ALT SERPL W P-5'-P-CCNC: 15 U/L (ref 1–33)
ANION GAP SERPL CALCULATED.3IONS-SCNC: 13 MMOL/L (ref 5–15)
AST SERPL-CCNC: 32 U/L (ref 1–32)
BILIRUB SERPL-MCNC: 0.8 MG/DL (ref 0–1.2)
BUN SERPL-MCNC: 14 MG/DL (ref 8–23)
BUN/CREAT SERPL: 15.6 (ref 7–25)
CALCIUM SPEC-SCNC: 9.7 MG/DL (ref 8.6–10.5)
CHLORIDE SERPL-SCNC: 103 MMOL/L (ref 98–107)
CO2 SERPL-SCNC: 23 MMOL/L (ref 22–29)
CREAT SERPL-MCNC: 0.9 MG/DL (ref 0.57–1)
DEPRECATED RDW RBC AUTO: 45.4 FL (ref 37–54)
EGFRCR SERPLBLD CKD-EPI 2021: 63.6 ML/MIN/1.73
ERYTHROCYTE [DISTWIDTH] IN BLOOD BY AUTOMATED COUNT: 12.7 % (ref 12.3–15.4)
GLOBULIN UR ELPH-MCNC: 3.3 GM/DL
GLUCOSE SERPL-MCNC: 88 MG/DL (ref 65–99)
HCT VFR BLD AUTO: 45.8 % (ref 34–46.6)
HGB BLD-MCNC: 15.3 G/DL (ref 12–15.9)
MCH RBC QN AUTO: 32.1 PG (ref 26.6–33)
MCHC RBC AUTO-ENTMCNC: 33.4 G/DL (ref 31.5–35.7)
MCV RBC AUTO: 96 FL (ref 79–97)
PLATELET # BLD AUTO: 179 10*3/MM3 (ref 140–450)
PMV BLD AUTO: 10.5 FL (ref 6–12)
POTASSIUM SERPL-SCNC: 4.2 MMOL/L (ref 3.5–5.2)
PROT SERPL-MCNC: 7.7 G/DL (ref 6–8.5)
RBC # BLD AUTO: 4.77 10*6/MM3 (ref 3.77–5.28)
SODIUM SERPL-SCNC: 139 MMOL/L (ref 136–145)
TSH SERPL DL<=0.05 MIU/L-ACNC: 1.08 UIU/ML (ref 0.27–4.2)
WBC NRBC COR # BLD AUTO: 3.96 10*3/MM3 (ref 3.4–10.8)

## 2025-06-12 PROCEDURE — 36415 COLL VENOUS BLD VENIPUNCTURE: CPT | Performed by: INTERNAL MEDICINE

## 2025-06-12 PROCEDURE — 85027 COMPLETE CBC AUTOMATED: CPT | Performed by: INTERNAL MEDICINE

## 2025-06-12 PROCEDURE — 73502 X-RAY EXAM HIP UNI 2-3 VIEWS: CPT

## 2025-06-12 PROCEDURE — 80053 COMPREHEN METABOLIC PANEL: CPT | Performed by: INTERNAL MEDICINE

## 2025-06-12 PROCEDURE — 84443 ASSAY THYROID STIM HORMONE: CPT | Performed by: INTERNAL MEDICINE

## 2025-06-12 RX ORDER — DIAZEPAM 5 MG/1
5 TABLET ORAL NIGHTLY PRN
Qty: 30 TABLET | Refills: 2 | Status: SHIPPED | OUTPATIENT
Start: 2025-06-12

## 2025-06-12 NOTE — PROGRESS NOTES
Answers submitted by the patient for this visit:  Questionnaire about: Other medical problem (Submitted on 6/11/2025)  Chief Complaint: Other medical problem  Chief Complaint  Hypertension, Hypothyroidism, and Insomnia    Subjective          Nicole Ruiz presents to [facility]    Hypertension-she is on lisinopril. She does check regularly and has been well-controlled - usually 120s/70s - but has not checked in about a month.  Did have her lisinopril this morning.      Hypothyroid-she is on Synthroid .  Her weight is down 4 pounds from December.  Bowel movements are regular     Osteoporosis-she did get Reclast in '19,  but had achiness for about 6 months so did not do in '20.We did restart evista in 4/22 but she had more and cramps with it so has stopped. She does take calcium/D and her vitamin D level looked good this year.    Was on forteo as well 20 years ago and tolerated  She did have repeat bone density in March and it showed osteoporosis in the left femoral neck and right forearm.  T-score in the right forearm was -3.8  Her vitamin D level was in the 50s when we checked it last year.  She does take a calcium/D and joint 3 select  She is walking regularly    Anxiety-patient uses Valium as needed.  She filled it in May and has plenty but would like to go ahead and get refills so when she needs Xanax.  Her  has memory loss but stable    L hip feels weak recently - feels like it may give out but has not fallen. Can be when she gets up. She walks 1-2 miles and may or not  Started about 2 months ago  She uses tylenol  She had R hip replacement - Dr paniagua in [place] did it      Current Outpatient Medications:     CALCIUM PO, Take  by mouth., Disp: , Rfl:     diazePAM (VALIUM) 5 MG tablet, Take 1 tablet by mouth At Night As Needed for Anxiety., Disp: 30 tablet, Rfl: 2    levothyroxine (SYNTHROID, LEVOTHROID) 75 MCG tablet, Take 1 tablet by mouth Daily., Disp: 90 tablet,  "Rfl: 3    lisinopril (PRINIVIL,ZESTRIL) 10 MG tablet, TAKE 1 TABLET DAILY, Disp: 90 tablet, Rfl: 0    VITAMIN D PO, Take  by mouth., Disp: , Rfl:    The following portions of the patient's history were reviewed and updated as appropriate: allergies, current medications, past family history, past medical history, past social history, past surgical history and problem list.     Objective   Vital Signs:   /92 (BP Location: Left arm, Patient Position: Sitting, Cuff Size: Adult)   Pulse 56   Temp 96.9 °F (36.1 °C) (Infrared)   Resp 14   Ht 161.7 cm (63.66\")   Wt 49 kg (108 lb)   SpO2 96%   BMI 18.74 kg/m²       Physical exam  Constitutional: oriented to person, place, and time.  well-developed and well-nourished. No distress.   HENT:   Head: Normocephalic and atraumatic.   Eyes: Conjunctivae and EOM are normal.   Cardiovascular: Normal rate, regular rhythm and normal heart sounds.  Exam reveals no gallop and no friction rub.    No murmur heard.  Pulmonary/Chest: Effort normal and breath sounds normal. No respiratory distress.   no wheezes.   Neurological:  alert and oriented to person, place, and time.   Skin: Skin is warm and dry. not diaphoretic.   MS: decreased L hip rotation  Psychiatric:  normal mood and affect. behavior is normal. Judgment and thought content normal.    Recheck 140/88  Physical Exam     Result Review :                    Lab Results   Component Value Date    WBC 3.07 (L) 12/13/2024    HGB 15.2 12/13/2024    HCT 44.2 12/13/2024    MCV 96.7 12/13/2024     12/13/2024     Lab Results   Component Value Date    GLUCOSE 87 12/13/2024    BUN 21 12/13/2024    CREATININE 0.99 12/13/2024     12/13/2024    K 4.7 12/13/2024     12/13/2024    CALCIUM 9.7 12/13/2024    PROTEINTOT 7.1 12/13/2024    ALBUMIN 4.1 12/13/2024    ALT 12 12/13/2024    AST 27 12/13/2024    ALKPHOS 71 12/13/2024    BILITOT 0.3 12/13/2024    GLOB 3.0 12/13/2024    AGRATIO 1.4 12/13/2024    BCR 21.2 12/13/2024 " "   ANIONGAP 12.9 12/13/2024    EGFR 57.0 (L) 12/13/2024     Lab Results   Component Value Date    CHOL 222 (H) 12/13/2024    TRIG 56 12/13/2024     (H) 12/13/2024     (H) 12/13/2024     No results found for: \"HGBA1C\"  Lab Results   Component Value Date    TSH 1.660 12/13/2024     No results found for: \"[id number]\"         Assessment and Plan      Diagnoses and all orders for this visit:    1. Age-related osteoporosis without current pathological fracture (Primary)  We will Try Prolia.  She has already researched some and feels like she Would be willing to try.  Continue walking in the 90s give her some body stretches/strengthening exercises as well.  She will continue her calcium/vitamin D  2. Acquired hypothyroidism-check levels today  -     TSH Rfx On Abnormal To Free T4; Future    3. Benign essential hypertension high today.  She will check at home daily over the next week and send me the readings  -     CBC (No Diff); Future  -     Cancel: Lipid Panel; Future  -     Comprehensive Metabolic Panel; Future    4. Anxiety-patient uses Valium as needed.  She has signed controlled substance contract.  Michael is appropriate  Orders:  -     diazePAM (VALIUM) 5 MG tablet; Take 1 tablet by mouth At Night As Needed for Anxiety.  Dispense: 30 tablet; Refill: 2    5. Left hip pain-we will check an x-ray  -     XR Hip With or Without Pelvis 2 - 3 View Left; Future            Follow Up   Return in about 6 months (around 12/12/2025) for Medicare Wellness.  Patient was given instructions and counseling regarding her condition or for health maintenance advice. Please see specific information pulled into the AVS if appropriate.   "

## 2025-06-16 DIAGNOSIS — M16.12 PRIMARY OSTEOARTHRITIS OF LEFT HIP: ICD-10-CM

## 2025-06-16 DIAGNOSIS — M87.052 AVASCULAR NECROSIS OF BONE OF LEFT HIP: Primary | ICD-10-CM

## 2025-07-22 ENCOUNTER — INFUSION (OUTPATIENT)
Dept: ONCOLOGY | Facility: HOSPITAL | Age: 85
End: 2025-07-22
Payer: MEDICARE

## 2025-07-22 VITALS
DIASTOLIC BLOOD PRESSURE: 88 MMHG | SYSTOLIC BLOOD PRESSURE: 157 MMHG | TEMPERATURE: 97 F | RESPIRATION RATE: 16 BRPM | HEART RATE: 83 BPM

## 2025-07-22 DIAGNOSIS — M81.0 AGE-RELATED OSTEOPOROSIS WITHOUT CURRENT PATHOLOGICAL FRACTURE: Primary | ICD-10-CM

## 2025-07-22 PROCEDURE — 25010000002 DENOSUMAB 60 MG/ML SOLUTION PREFILLED SYRINGE: Performed by: INTERNAL MEDICINE

## 2025-07-22 PROCEDURE — 96372 THER/PROPH/DIAG INJ SC/IM: CPT

## 2025-07-22 RX ORDER — DICLOFENAC SODIUM 75 MG/1
1 TABLET, DELAYED RELEASE ORAL EVERY 12 HOURS SCHEDULED
COMMUNITY
Start: 2025-07-07

## 2025-07-22 RX ADMIN — DENOSUMAB 60 MG: 60 INJECTION SUBCUTANEOUS at 15:08

## 2025-08-27 DIAGNOSIS — I10 BENIGN ESSENTIAL HYPERTENSION: Chronic | ICD-10-CM

## 2025-08-27 RX ORDER — LISINOPRIL 10 MG/1
10 TABLET ORAL DAILY
Qty: 90 TABLET | Refills: 0 | Status: SHIPPED | OUTPATIENT
Start: 2025-08-27